# Patient Record
Sex: FEMALE | Race: ASIAN | NOT HISPANIC OR LATINO | Employment: UNEMPLOYED | URBAN - METROPOLITAN AREA
[De-identification: names, ages, dates, MRNs, and addresses within clinical notes are randomized per-mention and may not be internally consistent; named-entity substitution may affect disease eponyms.]

---

## 2021-12-13 LAB
HPV16+18+H RISK 12 DNA CVX-IMP: NEGATIVE
PAP RECOMMENDATION EXT: NORMAL
PAP SMEAR: NORMAL

## 2022-04-11 ENCOUNTER — HISTORICAL (OUTPATIENT)
Dept: ADMINISTRATIVE | Facility: HOSPITAL | Age: 37
End: 2022-04-11

## 2022-04-28 VITALS
BODY MASS INDEX: 33.12 KG/M2 | DIASTOLIC BLOOD PRESSURE: 90 MMHG | OXYGEN SATURATION: 98 % | HEIGHT: 63 IN | WEIGHT: 186.94 LBS | SYSTOLIC BLOOD PRESSURE: 139 MMHG

## 2022-05-08 ENCOUNTER — HOSPITAL ENCOUNTER (EMERGENCY)
Facility: HOSPITAL | Age: 37
Discharge: HOME | End: 2022-05-08
Attending: EMERGENCY MEDICINE
Payer: MEDICAID

## 2022-05-08 VITALS
WEIGHT: 200.63 LBS | HEIGHT: 62 IN | RESPIRATION RATE: 16 BRPM | TEMPERATURE: 98 F | OXYGEN SATURATION: 98 % | DIASTOLIC BLOOD PRESSURE: 91 MMHG | SYSTOLIC BLOOD PRESSURE: 137 MMHG | HEART RATE: 75 BPM | BODY MASS INDEX: 36.92 KG/M2

## 2022-05-08 DIAGNOSIS — R22.0 LEFT FACIAL SWELLING: ICD-10-CM

## 2022-05-08 DIAGNOSIS — B02.9 HERPES ZOSTER WITHOUT COMPLICATION: Primary | ICD-10-CM

## 2022-05-08 PROBLEM — O98.419 CHRONIC HEPATITIS B AFFECTING ANTEPARTUM CARE OF MOTHER: Status: ACTIVE | Noted: 2021-08-02

## 2022-05-08 PROBLEM — O99.210 OBESITY IN PREGNANCY: Status: ACTIVE | Noted: 2021-08-02

## 2022-05-08 PROBLEM — O10.919 CHRONIC HYPERTENSION IN PREGNANCY: Status: ACTIVE | Noted: 2021-08-02

## 2022-05-08 PROBLEM — B18.1 CHRONIC HEPATITIS B AFFECTING ANTEPARTUM CARE OF MOTHER: Status: ACTIVE | Noted: 2021-08-02

## 2022-05-08 LAB
ALBUMIN SERPL-MCNC: 4.4 GM/DL (ref 3.5–5)
ALBUMIN/GLOB SERPL: 1.2 RATIO (ref 1.1–2)
ALP SERPL-CCNC: 44 UNIT/L (ref 40–150)
ALT SERPL-CCNC: 54 UNIT/L (ref 0–55)
AST SERPL-CCNC: 34 UNIT/L (ref 5–34)
B-HCG UR QL: NEGATIVE
BASOPHILS # BLD AUTO: 0.02 X10(3)/MCL (ref 0–0.2)
BASOPHILS NFR BLD AUTO: 0.4 %
BILIRUBIN DIRECT+TOT PNL SERPL-MCNC: 0.2 MG/DL (ref 0–0.5)
BILIRUBIN DIRECT+TOT PNL SERPL-MCNC: 0.4 MG/DL (ref 0–0.8)
BILIRUBIN DIRECT+TOT PNL SERPL-MCNC: 0.6 MG/DL
BUN SERPL-MCNC: 10.5 MG/DL (ref 7–18.7)
CALCIUM SERPL-MCNC: 9.7 MG/DL (ref 8.4–10.2)
CHLORIDE SERPL-SCNC: 107 MMOL/L (ref 98–107)
CO2 SERPL-SCNC: 26 MMOL/L (ref 22–29)
CREAT SERPL-MCNC: 0.68 MG/DL (ref 0.55–1.02)
CTP QC/QA: YES
EOSINOPHIL # BLD AUTO: 0.16 X10(3)/MCL (ref 0–0.9)
EOSINOPHIL NFR BLD AUTO: 3.3 %
ERYTHROCYTE [DISTWIDTH] IN BLOOD BY AUTOMATED COUNT: 11.4 % (ref 11.5–17)
GLOBULIN SER-MCNC: 3.6 GM/DL (ref 2.4–3.5)
GLUCOSE SERPL-MCNC: 97 MG/DL (ref 74–100)
HCT VFR BLD AUTO: 41.9 % (ref 37–47)
HGB BLD-MCNC: 14.2 GM/DL (ref 12–16)
IMM GRANULOCYTES # BLD AUTO: 0.02 X10(3)/MCL (ref 0–0.02)
IMM GRANULOCYTES NFR BLD AUTO: 0.4 % (ref 0–0.43)
LYMPHOCYTES # BLD AUTO: 1.15 X10(3)/MCL (ref 0.6–4.6)
LYMPHOCYTES NFR BLD AUTO: 23.7 %
MCH RBC QN AUTO: 30.1 PG (ref 27–31)
MCHC RBC AUTO-ENTMCNC: 33.9 MG/DL (ref 33–36)
MCV RBC AUTO: 88.8 FL (ref 80–94)
MONOCYTES # BLD AUTO: 0.27 X10(3)/MCL (ref 0.1–1.3)
MONOCYTES NFR BLD AUTO: 5.6 %
NEUTROPHILS # BLD AUTO: 3.2 X10(3)/MCL (ref 2.1–9.2)
NEUTROPHILS NFR BLD AUTO: 66.6 %
NRBC BLD AUTO-RTO: 0 %
PLATELET # BLD AUTO: 242 X10(3)/MCL (ref 130–400)
PMV BLD AUTO: 9.5 FL (ref 9.4–12.4)
POTASSIUM SERPL-SCNC: 3.7 MMOL/L (ref 3.5–5.1)
PROT SERPL-MCNC: 8 GM/DL (ref 6.4–8.3)
RBC # BLD AUTO: 4.72 X10(6)/MCL (ref 4.2–5.4)
SODIUM SERPL-SCNC: 139 MMOL/L (ref 136–145)
WBC # SPEC AUTO: 4.9 X10(3)/MCL (ref 4.5–11.5)

## 2022-05-08 PROCEDURE — 85025 COMPLETE CBC W/AUTO DIFF WBC: CPT | Performed by: NURSE PRACTITIONER

## 2022-05-08 PROCEDURE — 99284 EMERGENCY DEPT VISIT MOD MDM: CPT | Mod: 25

## 2022-05-08 PROCEDURE — 81025 URINE PREGNANCY TEST: CPT | Performed by: NURSE PRACTITIONER

## 2022-05-08 PROCEDURE — 36415 COLL VENOUS BLD VENIPUNCTURE: CPT | Performed by: NURSE PRACTITIONER

## 2022-05-08 PROCEDURE — 80053 COMPREHEN METABOLIC PANEL: CPT | Performed by: NURSE PRACTITIONER

## 2022-05-08 RX ORDER — GABAPENTIN 300 MG/1
300 CAPSULE ORAL DAILY
Qty: 30 CAPSULE | Refills: 0 | Status: SHIPPED | OUTPATIENT
Start: 2022-05-08 | End: 2023-05-18

## 2022-05-08 RX ORDER — ACETAMINOPHEN AND CODEINE PHOSPHATE 120; 12 MG/5ML; MG/5ML
1 SOLUTION ORAL DAILY
COMMUNITY
Start: 2022-04-30

## 2022-05-08 RX ORDER — ACYCLOVIR 800 MG/1
800 TABLET ORAL
Qty: 35 TABLET | Refills: 0 | Status: SHIPPED | OUTPATIENT
Start: 2022-05-08 | End: 2023-05-18

## 2022-05-08 RX ORDER — DICLOFENAC SODIUM 75 MG/1
75 TABLET, DELAYED RELEASE ORAL 2 TIMES DAILY
Qty: 14 TABLET | Refills: 0 | Status: SHIPPED | OUTPATIENT
Start: 2022-05-08 | End: 2022-05-15

## 2022-05-08 NOTE — DISCHARGE INSTRUCTIONS
Follow up with your primary care physician in 3-5 days for follow up evaluation.  Take pain medication as prescribed.  Return to the [facility] ED in 3 days for worsening redness, tenderness, or drainage from area.

## 2022-05-08 NOTE — ED PROVIDER NOTES
"Encounter Date: 5/8/2022       History     Chief Complaint   Patient presents with    Facial Swelling     Started with pain lt temple area then small blisters and redness , that progress to swelling over lt side of face and eyelid , sclera clear , nodes in neck hard and tender and enlarged , pt afebrile currently, and blisttered area has scabbed over      Pt is a 36 yr old female who presents to the [facility] ED reporting a cluster of "blisters" to her Lt forehead. Symptoms x 3 days. Admits to being under stress due to her children recently being sick. Pt verbalizes concerns because she is also having swelling to her Lt upper eyelid as well as to her to Lt side of her neck. Denies cough, fever, chest pain, change in vision in affected eye, SOB, swelling to lips/throat, inability to swallow, or abdominal pain.         Review of patient's allergies indicates:  No Known Allergies  No past medical history on file.  No past surgical history on file.  No family history on file.     Review of Systems   Constitutional: Negative for fever.   HENT: Positive for facial swelling. Negative for ear discharge, ear pain, hearing loss, mouth sores, rhinorrhea, sinus pressure, sinus pain, sore throat and trouble swallowing.    Respiratory: Negative for chest tightness and shortness of breath.    Cardiovascular: Negative for chest pain.   Gastrointestinal: Negative for nausea.   Genitourinary: Negative for dysuria.   Musculoskeletal: Negative for back pain.   Skin: Negative for rash.   Neurological: Negative for weakness.   Hematological: Does not bruise/bleed easily.   All other systems reviewed and are negative.      Physical Exam     Initial Vitals [05/08/22 1013]   BP Pulse Resp Temp SpO2   (!) 137/91 75 16 98.1 °F (36.7 °C) 98 %      MAP       --         Physical Exam    Nursing note and vitals reviewed.  Constitutional: She appears well-developed.   HENT:   Head: Normocephalic and atraumatic.       Eyes: Conjunctivae and EOM are " normal. Pupils are equal, round, and reactive to light. Left eye exhibits no discharge and no exudate.       Neck: Neck supple.   Normal range of motion.  Cardiovascular: Normal rate and regular rhythm.   Pulmonary/Chest: Breath sounds normal.   Abdominal: Abdomen is soft. Bowel sounds are normal. There is no rebound, no guarding, no tenderness at McBurney's point and negative Turner's sign. negative psoas sign  Musculoskeletal:         General: Normal range of motion.      Cervical back: Normal range of motion and neck supple.     Lymphadenopathy:     She has cervical adenopathy.        Left cervical: Superficial cervical (No erythema, slight tenderness) adenopathy present.   Neurological: She is alert and oriented to person, place, and time. She has normal strength and normal reflexes.   Skin: Skin is warm and dry. Capillary refill takes less than 2 seconds.   Psychiatric: She has a normal mood and affect. Her speech is normal and behavior is normal. Judgment and thought content normal.         ED Course   Procedures  Labs Reviewed   COMPREHENSIVE METABOLIC PANEL - Abnormal; Notable for the following components:       Result Value    Globulin 3.6 (*)     All other components within normal limits   CBC WITH DIFFERENTIAL - Abnormal; Notable for the following components:    RDW 11.4 (*)     IG# 0.02 (*)     All other components within normal limits   POCT URINE PREGNANCY - Abnormal; Notable for the following components:    POC Preg Test, Ur Negative (*)     All other components within normal limits   CBC W/ AUTO DIFFERENTIAL    Narrative:     The following orders were created for panel order CBC auto differential.  Procedure                               Abnormality         Status                     ---------                               -----------         ------                     CBC with Differential[[id number]]        Abnormal            Final result                 Please view results for these tests on the  individual orders.          Imaging Results    None          Medications - No data to display  Medical Decision Making:   Differential Diagnosis:   Shingles  Cellulitis  Clinical Tests:   Lab Tests: Ordered and Reviewed  The following lab test(s) were unremarkable: CBC, CMP and UPT  ED Management:  11:48 Pt presentation is consistent with suspected shingles rash. I will place pt on antiviral medication for issue as well as symptoms relief. Discussed with pt return to ED instructions including her need to return immediately for swelling to throat/tongue/lips, chest pain, or SOB.  Reassessed patient at this time. Reports condition has improved. Discussed with patient all pertinent ED information and results. Discussed diagnosis and treatment plan with patient. Follow up instructions and return to ED instruction have been given. All questions and concerns were addressed at this time. Patient voices understanding of information and instructions. Patient is comfortable with plan and discharge. Patient is stable for discharge.                         Clinical Impression:   Final diagnoses:  [R22.0] Left facial swelling  [B02.9] Herpes zoster without complication (Primary)          ED Disposition Condition    Discharge Stable        ED Prescriptions     Medication Sig Dispense Start Date End Date Auth. Provider    acyclovir (ZOVIRAX) 800 MG Tab Take 1 tablet (800 mg total) by mouth 5 (five) times daily. for 7 days 35 tablet 5/8/2022 5/15/2022 JOSE Prabhakar Jr.    diclofenac (VOLTAREN) 75 MG EC tablet Take 1 tablet (75 mg total) by mouth 2 (two) times daily. for 7 days 14 tablet 5/8/2022 5/15/2022 BOBBY Prabhakar Jr.P    gabapentin (NEURONTIN) 300 MG capsule Take 1 capsule (300 mg total) by mouth once daily. 30 capsule 5/8/2022 6/7/2022 JOSE Prabhakar Jr.        Follow-up Information     Follow up With Specialties Details Why Contact Info    [facility] Emergency Medicine In 3 days  As needed, If symptoms worsen [address] [id number] [phone]           Bebo Mobley Jr., P  05/08/22 5542

## 2023-03-06 ENCOUNTER — CLINICAL SUPPORT (OUTPATIENT)
Dept: URGENT CARE | Facility: CLINIC | Age: 38
End: 2023-03-06
Payer: MEDICAID

## 2023-03-06 VITALS
TEMPERATURE: 102 F | WEIGHT: 207.88 LBS | HEART RATE: 111 BPM | SYSTOLIC BLOOD PRESSURE: 128 MMHG | RESPIRATION RATE: 22 BRPM | BODY MASS INDEX: 36.83 KG/M2 | OXYGEN SATURATION: 98 % | HEIGHT: 63 IN | DIASTOLIC BLOOD PRESSURE: 86 MMHG

## 2023-03-06 DIAGNOSIS — B08.4 HAND, FOOT AND MOUTH DISEASE: Primary | ICD-10-CM

## 2023-03-06 DIAGNOSIS — R52 BODY ACHES: ICD-10-CM

## 2023-03-06 LAB
CTP QC/QA: YES
CTP QC/QA: YES
FLUAV AG NPH QL: NEGATIVE
FLUBV AG NPH QL: NEGATIVE
SARS-COV-2 RDRP RESP QL NAA+PROBE: NEGATIVE

## 2023-03-06 PROCEDURE — 99214 OFFICE O/P EST MOD 30 MIN: CPT | Mod: PBBFAC

## 2023-03-06 PROCEDURE — 99203 PR OFFICE/OUTPT VISIT, NEW, LEVL III, 30-44 MIN: ICD-10-PCS | Mod: S$PBB,,,

## 2023-03-06 PROCEDURE — 87635 SARS-COV-2 COVID-19 AMP PRB: CPT | Mod: PBBFAC

## 2023-03-06 PROCEDURE — 87804 INFLUENZA ASSAY W/OPTIC: CPT | Mod: 59,PBBFAC

## 2023-03-06 PROCEDURE — 99203 OFFICE O/P NEW LOW 30 MIN: CPT | Mod: S$PBB,,,

## 2023-03-06 NOTE — LETTER
March 6, 2023      [facility]  [address] [address]  Phone: [phone]       Patient: Gretel Stein   YOB: 1985  Date of Visit: 03/06/2023    To Whom It May Concern:    Abel Stein  was at [facility] on 03/06/2023. The patient may return to work/school on MAR 11 2023 with no restrictions. If you have any questions or concerns, or if I can be of further assistance, please do not hesitate to contact me.    Sincerely,    KADI STRICKLAND NP

## 2023-03-07 NOTE — PROGRESS NOTES
"Subjective:       Patient ID: Gretel Stein is a 37 y.o. female.    Vitals:  height is 5' 2.99" (1.6 m) and weight is 94.3 kg (207 lb 14.3 oz). Her temperature is 102 °F (38.9 °C) (abnormal). Her blood pressure is 128/86 and her pulse is 111 (abnormal). Her respiration is 22 (abnormal) and oxygen saturation is 98%.     Chief Complaint: Fever (101.3 at home) and Generalized Body Aches (X 2days)    Pt states fever and body aches for the last 2 days. Has a small spot on hand and in throat, son has hand, foot and mouth.     Fever   Associated symptoms include a rash.     Constitution: Positive for fever.   HENT:  Positive for mouth sores.    Neck: neck negative.   Cardiovascular: Negative.    Eyes: Negative.    Respiratory: Negative.     Gastrointestinal: Negative.    Genitourinary: Negative.    Musculoskeletal: Negative.    Skin:  Positive for rash.   Neurological: Negative.      Objective:      Physical Exam   HENT:   Head: Normocephalic.   Ears:   Right Ear: Tympanic membrane, external ear and ear canal normal.   Left Ear: Tympanic membrane, external ear and ear canal normal.   Nose: Nose normal.   Mouth/Throat: Uvula is midline and mucous membranes are normal. Mucous membranes are moist. Posterior oropharyngeal erythema present.       Eyes: Pupils are equal, round, and reactive to light.   Abdominal: Normal appearance.   Neurological: She is alert.   Vitals reviewed.      Assessment:       1. Hand, foot and mouth disease    2. Body aches            Plan:         Hand, foot and mouth disease    Body aches  -     POCT COVID-19 Rapid Screening  -     POCT Influenza A/B    Take Tylenol/ibuprofen as needed for pain and fever.  Can you throat lozenges or spray for throat pain.    Plenty of fluids and rest.    ER precautions given, patient verbalized understanding.     Please see provided patient education for guidance.    Follow up with PCP or return to clinic if symptoms worsen or do not improve.                     "

## 2023-05-15 ENCOUNTER — PATIENT OUTREACH (OUTPATIENT)
Dept: ADMINISTRATIVE | Facility: HOSPITAL | Age: 38
End: 2023-05-15
Payer: MEDICAID

## 2023-05-18 ENCOUNTER — OFFICE VISIT (OUTPATIENT)
Dept: FAMILY MEDICINE | Facility: CLINIC | Age: 38
End: 2023-05-18
Payer: MEDICAID

## 2023-05-18 VITALS
RESPIRATION RATE: 18 BRPM | HEART RATE: 71 BPM | TEMPERATURE: 98 F | WEIGHT: 211 LBS | DIASTOLIC BLOOD PRESSURE: 84 MMHG | HEIGHT: 63 IN | BODY MASS INDEX: 37.39 KG/M2 | OXYGEN SATURATION: 99 % | SYSTOLIC BLOOD PRESSURE: 131 MMHG

## 2023-05-18 DIAGNOSIS — Z00.00 WELLNESS EXAMINATION: Primary | ICD-10-CM

## 2023-05-18 LAB
ALBUMIN SERPL-MCNC: 4.4 G/DL (ref 3.5–5)
ALBUMIN/GLOB SERPL: 1.2 RATIO (ref 1.1–2)
ALP SERPL-CCNC: 45 UNIT/L (ref 40–150)
ALT SERPL-CCNC: 83 UNIT/L (ref 0–55)
APPEARANCE UR: CLEAR
AST SERPL-CCNC: 40 UNIT/L (ref 5–34)
BACTERIA #/AREA URNS AUTO: ABNORMAL /HPF
BILIRUB UR QL STRIP.AUTO: NEGATIVE MG/DL
BILIRUBIN DIRECT+TOT PNL SERPL-MCNC: 0.5 MG/DL
BUN SERPL-MCNC: 7 MG/DL (ref 7–18.7)
CALCIUM SERPL-MCNC: 9.4 MG/DL (ref 8.4–10.2)
CHLORIDE SERPL-SCNC: 106 MMOL/L (ref 98–107)
CHOLEST SERPL-MCNC: 209 MG/DL
CHOLEST/HDLC SERPL: 4 {RATIO} (ref 0–5)
CO2 SERPL-SCNC: 25 MMOL/L (ref 22–29)
COLOR UR: ABNORMAL
CREAT SERPL-MCNC: 0.7 MG/DL (ref 0.55–1.02)
ERYTHROCYTE [DISTWIDTH] IN BLOOD BY AUTOMATED COUNT: 11.9 % (ref 11.5–17)
EST. AVERAGE GLUCOSE BLD GHB EST-MCNC: 111.2 MG/DL
GFR SERPLBLD CREATININE-BSD FMLA CKD-EPI: >60 MLS/MIN/1.73/M2
GLOBULIN SER-MCNC: 3.6 GM/DL (ref 2.4–3.5)
GLUCOSE SERPL-MCNC: 83 MG/DL (ref 74–100)
GLUCOSE UR QL STRIP.AUTO: NORMAL MG/DL
HBA1C MFR BLD: 5.5 %
HCT VFR BLD AUTO: 41.9 % (ref 37–47)
HCV AB SERPL QL IA: NONREACTIVE
HDLC SERPL-MCNC: 51 MG/DL (ref 35–60)
HGB BLD-MCNC: 14.4 G/DL (ref 12–16)
HIV 1+2 AB+HIV1 P24 AG SERPL QL IA: NONREACTIVE
HYALINE CASTS #/AREA URNS LPF: ABNORMAL /LPF
KETONES UR QL STRIP.AUTO: NEGATIVE MG/DL
LDLC SERPL CALC-MCNC: 137 MG/DL (ref 50–140)
LEUKOCYTE ESTERASE UR QL STRIP.AUTO: NEGATIVE UNIT/L
MCH RBC QN AUTO: 31 PG (ref 27–31)
MCHC RBC AUTO-ENTMCNC: 34.4 G/DL (ref 33–36)
MCV RBC AUTO: 90.3 FL (ref 80–94)
MUCOUS THREADS URNS QL MICRO: ABNORMAL /LPF
NITRITE UR QL STRIP.AUTO: NEGATIVE
NRBC BLD AUTO-RTO: 0 %
PH UR STRIP.AUTO: 6 [PH]
PLATELET # BLD AUTO: 286 X10(3)/MCL (ref 130–400)
PMV BLD AUTO: 9.8 FL (ref 7.4–10.4)
POTASSIUM SERPL-SCNC: 3.8 MMOL/L (ref 3.5–5.1)
PROT SERPL-MCNC: 8 GM/DL (ref 6.4–8.3)
PROT UR QL STRIP.AUTO: NEGATIVE MG/DL
RBC # BLD AUTO: 4.64 X10(6)/MCL (ref 4.2–5.4)
RBC #/AREA URNS AUTO: ABNORMAL /HPF
RBC UR QL AUTO: ABNORMAL UNIT/L
SODIUM SERPL-SCNC: 141 MMOL/L (ref 136–145)
SP GR UR STRIP.AUTO: 1.01
SQUAMOUS #/AREA URNS LPF: ABNORMAL /HPF
TRIGL SERPL-MCNC: 103 MG/DL (ref 37–140)
TSH SERPL-ACNC: 1.36 UIU/ML (ref 0.35–4.94)
UROBILINOGEN UR STRIP-ACNC: NORMAL MG/DL
VLDLC SERPL CALC-MCNC: 21 MG/DL
WBC # SPEC AUTO: 5.48 X10(3)/MCL (ref 4.5–11.5)
WBC #/AREA URNS AUTO: ABNORMAL /HPF

## 2023-05-18 PROCEDURE — 1160F PR REVIEW ALL MEDS BY PRESCRIBER/CLIN PHARMACIST DOCUMENTED: ICD-10-PCS | Mod: CPTII,,, | Performed by: NURSE PRACTITIONER

## 2023-05-18 PROCEDURE — 90677 PCV20 VACCINE IM: CPT | Mod: PBBFAC

## 2023-05-18 PROCEDURE — 3079F DIAST BP 80-89 MM HG: CPT | Mod: CPTII,,, | Performed by: NURSE PRACTITIONER

## 2023-05-18 PROCEDURE — 1159F MED LIST DOCD IN RCRD: CPT | Mod: CPTII,,, | Performed by: NURSE PRACTITIONER

## 2023-05-18 PROCEDURE — 3008F BODY MASS INDEX DOCD: CPT | Mod: CPTII,,, | Performed by: NURSE PRACTITIONER

## 2023-05-18 PROCEDURE — 3075F SYST BP GE 130 - 139MM HG: CPT | Mod: CPTII,,, | Performed by: NURSE PRACTITIONER

## 2023-05-18 PROCEDURE — 80061 LIPID PANEL: CPT | Performed by: NURSE PRACTITIONER

## 2023-05-18 PROCEDURE — 85027 COMPLETE CBC AUTOMATED: CPT | Performed by: NURSE PRACTITIONER

## 2023-05-18 PROCEDURE — 1159F PR MEDICATION LIST DOCUMENTED IN MEDICAL RECORD: ICD-10-PCS | Mod: CPTII,,, | Performed by: NURSE PRACTITIONER

## 2023-05-18 PROCEDURE — 36415 COLL VENOUS BLD VENIPUNCTURE: CPT | Performed by: NURSE PRACTITIONER

## 2023-05-18 PROCEDURE — 3075F PR MOST RECENT SYSTOLIC BLOOD PRESS GE 130-139MM HG: ICD-10-PCS | Mod: CPTII,,, | Performed by: NURSE PRACTITIONER

## 2023-05-18 PROCEDURE — 84443 ASSAY THYROID STIM HORMONE: CPT | Performed by: NURSE PRACTITIONER

## 2023-05-18 PROCEDURE — 3008F PR BODY MASS INDEX (BMI) DOCUMENTED: ICD-10-PCS | Mod: CPTII,,, | Performed by: NURSE PRACTITIONER

## 2023-05-18 PROCEDURE — 1160F RVW MEDS BY RX/DR IN RCRD: CPT | Mod: CPTII,,, | Performed by: NURSE PRACTITIONER

## 2023-05-18 PROCEDURE — 80053 COMPREHEN METABOLIC PANEL: CPT | Performed by: NURSE PRACTITIONER

## 2023-05-18 PROCEDURE — 87389 HIV-1 AG W/HIV-1&-2 AB AG IA: CPT | Performed by: NURSE PRACTITIONER

## 2023-05-18 PROCEDURE — 90471 IMMUNIZATION ADMIN: CPT | Mod: PBBFAC

## 2023-05-18 PROCEDURE — 83036 HEMOGLOBIN GLYCOSYLATED A1C: CPT | Performed by: NURSE PRACTITIONER

## 2023-05-18 PROCEDURE — 3079F PR MOST RECENT DIASTOLIC BLOOD PRESSURE 80-89 MM HG: ICD-10-PCS | Mod: CPTII,,, | Performed by: NURSE PRACTITIONER

## 2023-05-18 PROCEDURE — 99395 PREV VISIT EST AGE 18-39: CPT | Mod: S$PBB,,, | Performed by: NURSE PRACTITIONER

## 2023-05-18 PROCEDURE — 86803 HEPATITIS C AB TEST: CPT | Performed by: NURSE PRACTITIONER

## 2023-05-18 PROCEDURE — 99214 OFFICE O/P EST MOD 30 MIN: CPT | Mod: PBBFAC | Performed by: NURSE PRACTITIONER

## 2023-05-18 PROCEDURE — 81001 URINALYSIS AUTO W/SCOPE: CPT | Performed by: NURSE PRACTITIONER

## 2023-05-18 PROCEDURE — 99395 PR PREVENTIVE VISIT,EST,18-39: ICD-10-PCS | Mod: S$PBB,,, | Performed by: NURSE PRACTITIONER

## 2023-05-18 RX ADMIN — PNEUMOCOCCAL 20-VALENT CONJUGATE VACCINE 0.5 ML
2.2; 2.2; 2.2; 2.2; 2.2; 2.2; 2.2; 2.2; 2.2; 2.2; 2.2; 2.2; 2.2; 2.2; 2.2; 2.2; 4.4; 2.2; 2.2; 2.2 INJECTION, SUSPENSION INTRAMUSCULAR at 09:05

## 2023-05-18 NOTE — PROGRESS NOTES
14:55 spoke to patient regarding her blood work test results over the phone, patient id self and accurately give date of birth.  Discussed lab work, only abnormality can see she has elevated liver enzymes.  Patient state she does drink alcohol more often also she has a chronic hepatitis B infection.  Instructed patient will continue to monitor, decrease alcohol intake because previously 1 year ago new liver enzymes were within normal range.  Patient does not consume large about acetaminophen.  She might take 1-2 tab a day if needed for aches.  Will re-evaluate on next appointment.  Urinalysis shows blood in urine, patient had missed her initial cycle in March, and possibly she is restarting her menstrual cycles.  Denies UTI symptoms.  She return to clinic if she is having any issues.  Hemoglobin 1 AC within normal range, estimated average blood glucose 111, hemoglobin A1c 5.5%.  Discussed weight loss, monitor portion size.  Stay hydrated with water.  Otherwise blood work within normal range.  Patient verbalized understanding and agreed to plan of care

## 2023-05-18 NOTE — PROGRESS NOTES
Patient Name: Gretel Stein   : 1985  MRN: [id number]     SUBJECTIVE DATA:    CHIEF COMPLAINT:   Gretel Stein is a 37 y.o. female who presents to clinic today with Establish Care        HPI:  37-year-old female presents to the clinic to complete her yearly annual wellness exam.    Social Determinants of Health     Tobacco Use: High Risk    Smoking Tobacco Use: Every Day    Smokeless Tobacco Use: Never    Passive Exposure: Not on file   Alcohol Use: Not At Risk    Frequency of Alcohol Consumption: Monthly or less    Average Number of Drinks: 3 or 4    Frequency of Binge Drinking: Never   Financial Resource Strain: Low Risk     Difficulty of Paying Living Expenses: Not hard at all   Food Insecurity: No Food Insecurity    Worried About Running Out of Food in the Last Year: Never true    Ran Out of Food in the Last Year: Never true   Transportation Needs: No Transportation Needs    Lack of Transportation (Medical): No    Lack of Transportation (Non-Medical): No   Physical Activity: Sufficiently Active    Days of Exercise per Week: 4 days    Minutes of Exercise per Session: 40 min   Stress: No Stress Concern Present    Feeling of Stress : Not at all   Social Connections: Moderately Integrated    Frequency of Communication with Friends and Family: Twice a week    Frequency of Social Gatherings with Friends and Family: Twice a week    Attends Yarsanism Services: 1 to 4 times per year    Active Member of Clubs or Organizations: No    Attends Club or Organization Meetings: 1 to 4 times per year    Marital Status: Never    Housing Stability: Low Risk     Unable to Pay for Housing in the Last Year: No    Number of Places Lived in the Last Year: 1    Unstable Housing in the Last Year: No   Depression: Low Risk     Last PHQ Score: 0   LMP march.  Gun safety:  Does not own a gun.  Car safety:  Seat belt used all the time.  Water:  Stay hydrated with fluids, drink 6-8 cups of water daily.  Smoke cessation:  Patient declined  "smoke cessation, and she is trying to quit on her own.  Patient is made aware of services offered at [facility].  Alcohol:  Drink in moderation.  Exercise: exercise 30 minutes a day up to 5 days a week.  Stay active.  Nutrition:  Follow low-cholesterol, low-fat, low-salt diet.  Consume fresh fruits and vegetables.  Keep in mind serving or portion size, lose weight.  Dental:  Patient does follow-up with a dentist yearly.  Ophthalmology:  Patient does follow-up with ophthalmologist yearly.  Did not bring her glasses with her today.  Cervical cancer screening exam completed on Dec, 2022  Pneumococcal vaccine IM given the clinic.  Fasting blood work drawn today will notify of test results when they become available.  Patient denies chest pain, shortness of breath, dyspnea on exertion, palpitations, peripheral edema, abdominal pain, nausea, vomiting, diarrhea, constipation, fatigue, fever, chills, dysuria,  hematuria, melena, or hematochezia.      HPI      ALLERGIES: Review of patient's allergies indicates:  No Known Allergies      ROS:  Review of Systems   All other systems reviewed and are negative.      OBJECTIVE DATA:  Vital signs  Vitals:    05/18/23 0831   BP: 131/84   Pulse: 71   Resp: 18   Temp: 97.8 °F (36.6 °C)   TempSrc: Oral   SpO2: 99%   Weight: 95.7 kg (211 lb)   Height: 5' 3" (1.6 m)      Body mass index is 37.38 kg/m².    PHYSICAL EXAM:   Physical Exam  Vitals and nursing note reviewed.   Constitutional:       General: She is awake. She is not in acute distress.     Appearance: Normal appearance. She is well-developed and well-groomed. She is obese. She is not ill-appearing, toxic-appearing or diaphoretic.   HENT:      Head: Normocephalic and atraumatic.      Right Ear: Hearing, tympanic membrane, ear canal and external ear normal. There is no impacted cerumen.      Left Ear: Hearing, tympanic membrane, ear canal and external ear normal. There is no impacted cerumen.      Nose: Nose normal. No congestion or " rhinorrhea.      Right Nostril: No epistaxis.      Left Nostril: No epistaxis.      Right Turbinates: Not swollen.      Left Turbinates: Not swollen.      Right Sinus: No maxillary sinus tenderness or frontal sinus tenderness.      Left Sinus: No maxillary sinus tenderness or frontal sinus tenderness.      Mouth/Throat:      Lips: Pink.      Mouth: Mucous membranes are moist.      Pharynx: Oropharynx is clear. Uvula midline. No posterior oropharyngeal erythema.   Eyes:      General: Lids are normal. Gaze aligned appropriately. No scleral icterus.     Extraocular Movements: Extraocular movements intact.      Conjunctiva/sclera: Conjunctivae normal.      Pupils: Pupils are equal, round, and reactive to light.      Visual Fields: Right eye visual fields normal and left eye visual fields normal.   Neck:      Thyroid: No thyroid mass, thyromegaly or thyroid tenderness.      Vascular: No carotid bruit.      Trachea: Trachea and phonation normal.   Cardiovascular:      Rate and Rhythm: Normal rate and regular rhythm.      Pulses: Normal pulses.           Carotid pulses are 2+ on the right side and 2+ on the left side.       Radial pulses are 2+ on the right side and 2+ on the left side.        Femoral pulses are 2+ on the right side and 2+ on the left side.       Dorsalis pedis pulses are 2+ on the right side and 2+ on the left side.        Posterior tibial pulses are 2+ on the right side and 2+ on the left side.      Heart sounds: Normal heart sounds. No murmur heard.  Pulmonary:      Effort: Pulmonary effort is normal.      Breath sounds: Normal breath sounds and air entry. No wheezing or rhonchi.   Abdominal:      General: Abdomen is flat. Bowel sounds are normal. There is no distension.      Palpations: Abdomen is soft. There is no mass.      Tenderness: There is no abdominal tenderness. There is no right CVA tenderness, left CVA tenderness, guarding or rebound.      Hernia: No hernia is present.   Musculoskeletal:          General: Normal range of motion.      Cervical back: Full passive range of motion without pain, normal range of motion and neck supple. No rigidity or tenderness.      Right lower leg: No edema.      Left lower leg: No edema.      Right foot: Normal range of motion. No deformity, bunion, Charcot foot, foot drop or prominent metatarsal heads.      Left foot: Normal range of motion. No deformity, bunion, Charcot foot, foot drop or prominent metatarsal heads.        Feet:    Feet:      Right foot:      Protective Sensation: 9 sites tested.  9 sites sensed.      Skin integrity: Callus present.      Toenail Condition: Right toenails are normal.      Left foot:      Protective Sensation: 9 sites tested.  9 sites sensed.      Skin integrity: Skin integrity normal.      Toenail Condition: Left toenails are normal.      Comments: Maintain a good foot hygiene, continue to wear appropriate footwear.  Use Aquaphor help soften callus.  Continue to inspect feet as needed.  Lymphadenopathy:      Cervical: No cervical adenopathy.      Right cervical: No superficial cervical adenopathy.     Left cervical: No superficial cervical adenopathy.   Skin:     General: Skin is warm and dry.      Capillary Refill: Capillary refill takes less than 2 seconds.      Findings: No rash.   Neurological:      General: No focal deficit present.      Mental Status: She is alert and oriented to person, place, and time. Mental status is at baseline.      GCS: GCS eye subscore is 4. GCS verbal subscore is 5. GCS motor subscore is 6.      Cranial Nerves: Cranial nerves 2-12 are intact.      Sensory: Sensation is intact.      Motor: Motor function is intact.      Coordination: Coordination is intact.      Gait: Gait is intact. Gait normal.   Psychiatric:         Attention and Perception: Attention and perception normal.         Mood and Affect: Mood and affect normal.         Speech: Speech normal.         Behavior: Behavior normal. Behavior is  cooperative.         Thought Content: Thought content normal.         Cognition and Memory: Cognition and memory normal.         Judgment: Judgment normal.        ASSESSMENT/PLAN:  1. Wellness examination  -     Hemoglobin A1C  -     CBC Without Differential  -     Comprehensive Metabolic Panel  -     Lipid Panel  -     TSH  -     Urinalysis, Reflex to Urine Culture  -     HIV 1/2 Ag/Ab (4th Gen)  -     Hepatitis C Antibody  -     pneumoc 20-ira conj-dip cr(PF) (PREVNAR-20 (PF)) injection Syrg 0.5 mL           RESULTS:  No results found for this or any previous visit (from the past 1008 hour(s)).      Follow Up:  Follow up in about 6 months (around 11/18/2023).      Previous medical history/lab work/radiology reviewed and considered during medical management decisions.   Medication list reviewed and medication reconciliation performed.  Patient was provided  and care about his/her current diagnosis (es) and medications including risk/benefit and side effects/adverse events, over the counter medication uses/doses, home self-care and contact precautions,  and red flags and indications for when to seek immediate medical attention.   Patient was advised to continue compliance with current medication list and medical recommendations.  Patient dvised continued compliance with recommended eating habits/ diets for medical conditions and exercise 150 minutes/ week (if possible) for medical condition (s).  Educational handouts and instructions on selected disease management in AVS (After Visit Summary).    All of the patient's questions were answered to patient's satisfaction.   The patient was receptive, expressed verbal understanding and agreement the above plan.           This note was created with the assistance of a voice recognition software or phone dictation. There may be transcription errors as a result of using this technology however minimal. Effort has been made to assure accuracy of transcription but any  obvious errors or omissions should be clarified with the author of the document

## 2023-08-21 PROBLEM — Z00.00 WELLNESS EXAMINATION: Status: RESOLVED | Noted: 2023-05-18 | Resolved: 2023-08-21

## 2023-10-25 ENCOUNTER — OFFICE VISIT (OUTPATIENT)
Dept: URGENT CARE | Facility: CLINIC | Age: 38
End: 2023-10-25
Payer: MEDICAID

## 2023-10-25 ENCOUNTER — HOSPITAL ENCOUNTER (OUTPATIENT)
Dept: RADIOLOGY | Facility: HOSPITAL | Age: 38
Discharge: HOME | End: 2023-10-25
Payer: MEDICAID

## 2023-10-25 VITALS
DIASTOLIC BLOOD PRESSURE: 87 MMHG | WEIGHT: 210.31 LBS | SYSTOLIC BLOOD PRESSURE: 136 MMHG | TEMPERATURE: 98 F | OXYGEN SATURATION: 98 % | RESPIRATION RATE: 18 BRPM | BODY MASS INDEX: 37.26 KG/M2 | HEART RATE: 77 BPM | HEIGHT: 63 IN

## 2023-10-25 DIAGNOSIS — R22.42 LOCALIZED SWELLING OF LEFT FOOT: ICD-10-CM

## 2023-10-25 DIAGNOSIS — R22.42 LOCALIZED SWELLING OF LEFT FOOT: Primary | ICD-10-CM

## 2023-10-25 PROCEDURE — 99213 PR OFFICE/OUTPT VISIT, EST, LEVL III, 20-29 MIN: ICD-10-PCS | Mod: S$PBB,,,

## 2023-10-25 PROCEDURE — 73610 X-RAY EXAM OF ANKLE: CPT | Mod: TC,LT

## 2023-10-25 PROCEDURE — 99214 OFFICE O/P EST MOD 30 MIN: CPT | Mod: PBBFAC

## 2023-10-25 PROCEDURE — 73630 X-RAY EXAM OF FOOT: CPT | Mod: TC,LT

## 2023-10-25 PROCEDURE — 99213 OFFICE O/P EST LOW 20 MIN: CPT | Mod: S$PBB,,,

## 2023-10-25 RX ORDER — MELOXICAM 15 MG/1
7.5 TABLET ORAL DAILY
Qty: 5 TABLET | Refills: 0 | Status: SHIPPED | OUTPATIENT
Start: 2023-10-25 | End: 2023-11-29 | Stop reason: ALTCHOICE

## 2023-10-25 NOTE — PROGRESS NOTES
"Subjective:       Patient ID: Gretel Stein is a 38 y.o. female.    Vitals:  height is 5' 3" (1.6 m) and weight is 95.4 kg (210 lb 5.1 oz). Her temperature is 98.2 °F (36.8 °C). Her blood pressure is 136/87 and her pulse is 77. Her respiration is 18 and oxygen saturation is 98%.     Chief Complaint: Foot Pain (LT X 1 1/2 wks)    Left foot pain x 1 week  with swelling denies fall or injuries. Reports hurts worst with ambulation, reports minor relief with Tylenol and Eposom salt.    Foot Pain  This is a new problem. The current episode started in the past 7 days. The problem occurs constantly. The problem has been gradually worsening. Associated symptoms include arthralgias and joint swelling. Pertinent negatives include no chills or fever. The symptoms are aggravated by standing and walking. She has tried lying down, position changes and acetaminophen for the symptoms. The treatment provided mild relief.       Constitution: Negative for chills and fever.   Musculoskeletal:  Positive for joint pain and joint swelling. Negative for pain, trauma and abnormal ROM of joint.   Skin: Negative.    Hematologic/Lymphatic: Negative for history of blood clots.       Objective:      Physical Exam   Constitutional: She is oriented to person, place, and time.   HENT:   Head: Normocephalic and atraumatic.   Cardiovascular: Normal rate, regular rhythm and normal pulses.   Pulmonary/Chest: Effort normal and breath sounds normal.   Abdominal: Normal appearance.   Musculoskeletal:         General: Swelling present. No tenderness, deformity or signs of injury.      Right lower leg: No edema.      Left lower leg: Edema present.      Right foot: Normal.      Left foot: Normal range of motion and normal capillary refill. Swelling present. No tenderness, bony tenderness, crepitus or deformity.      Comments: Mild swelling to left dorsal foot, pedal pulse +2, No TTP, no erythema.   Neurological: no focal deficit. She is alert and oriented to " person, place, and time.   Skin: Skin is warm and dry. Capillary refill takes 2 to 3 seconds.   Nursing note and vitals reviewed.        Assessment:       1. Localized swelling of left foot          Plan:   Your imaging today in clinic are negative. ICE & Elevate extremity.  May take Mobic with Tylenol as needed for pain. Wear ace wrap to help with swelling and comfort. Follow up your PCP if symptoms does not improve. Do not take any other NSAID such as Ibuprofen or Aleve with Mobic. May increase GI bleeds.     Localized swelling of left foot  -     XR FOOT COMPLETE 3 VIEW LEFT; Future; Expected date: 10/25/2023  -     XR ANKLE COMPLETE 3 VIEW LEFT; Future; Expected date: 10/25/2023  -     meloxicam (MOBIC) 15 MG tablet; Take 0.5 tablets (7.5 mg total) by mouth once daily. Discontinue if GI symptoms develop  Dispense: 5 tablet; Refill: 0

## 2023-11-29 ENCOUNTER — OFFICE VISIT (OUTPATIENT)
Dept: FAMILY MEDICINE | Facility: CLINIC | Age: 38
End: 2023-11-29
Payer: MEDICAID

## 2023-11-29 VITALS
TEMPERATURE: 98 F | BODY MASS INDEX: 37.39 KG/M2 | HEART RATE: 82 BPM | WEIGHT: 211 LBS | OXYGEN SATURATION: 96 % | DIASTOLIC BLOOD PRESSURE: 84 MMHG | HEIGHT: 63 IN | SYSTOLIC BLOOD PRESSURE: 138 MMHG | RESPIRATION RATE: 18 BRPM

## 2023-11-29 DIAGNOSIS — O98.419 CHRONIC HEPATITIS B AFFECTING ANTEPARTUM CARE OF MOTHER: Primary | ICD-10-CM

## 2023-11-29 DIAGNOSIS — B18.1 CHRONIC HEPATITIS B AFFECTING ANTEPARTUM CARE OF MOTHER: Primary | ICD-10-CM

## 2023-11-29 DIAGNOSIS — R09.81 COUGH WITH CONGESTION OF PARANASAL SINUS: ICD-10-CM

## 2023-11-29 DIAGNOSIS — R05.8 COUGH WITH CONGESTION OF PARANASAL SINUS: ICD-10-CM

## 2023-11-29 DIAGNOSIS — M79.672 FOOT PAIN, LEFT: ICD-10-CM

## 2023-11-29 LAB
GGT SERPL-CCNC: 69 U/L (ref 9–36)
HAV IGM SERPL QL IA: NONREACTIVE
HBV CORE AB SERPL QL IA: REACTIVE
HBV CORE IGM SERPL QL IA: NONREACTIVE
HBV SURFACE AG SERPL QL IA: REACTIVE
HBV SURFACE AG SERPLBLD QL IA.RAPID: NORMAL
HCV AB SERPL QL IA: NONREACTIVE

## 2023-11-29 PROCEDURE — 3079F DIAST BP 80-89 MM HG: CPT | Mod: CPTII,,, | Performed by: NURSE PRACTITIONER

## 2023-11-29 PROCEDURE — 80074 ACUTE HEPATITIS PANEL: CPT | Performed by: NURSE PRACTITIONER

## 2023-11-29 PROCEDURE — 3008F PR BODY MASS INDEX (BMI) DOCUMENTED: ICD-10-PCS | Mod: CPTII,,, | Performed by: NURSE PRACTITIONER

## 2023-11-29 PROCEDURE — 1159F PR MEDICATION LIST DOCUMENTED IN MEDICAL RECORD: ICD-10-PCS | Mod: CPTII,,, | Performed by: NURSE PRACTITIONER

## 2023-11-29 PROCEDURE — 3008F BODY MASS INDEX DOCD: CPT | Mod: CPTII,,, | Performed by: NURSE PRACTITIONER

## 2023-11-29 PROCEDURE — 1159F MED LIST DOCD IN RCRD: CPT | Mod: CPTII,,, | Performed by: NURSE PRACTITIONER

## 2023-11-29 PROCEDURE — 87341 HEP B SURFACE AG NEUTRLZJ IA: CPT | Performed by: NURSE PRACTITIONER

## 2023-11-29 PROCEDURE — 3075F PR MOST RECENT SYSTOLIC BLOOD PRESS GE 130-139MM HG: ICD-10-PCS | Mod: CPTII,,, | Performed by: NURSE PRACTITIONER

## 2023-11-29 PROCEDURE — 36415 COLL VENOUS BLD VENIPUNCTURE: CPT | Performed by: NURSE PRACTITIONER

## 2023-11-29 PROCEDURE — 3075F SYST BP GE 130 - 139MM HG: CPT | Mod: CPTII,,, | Performed by: NURSE PRACTITIONER

## 2023-11-29 PROCEDURE — 80053 COMPREHEN METABOLIC PANEL: CPT | Performed by: NURSE PRACTITIONER

## 2023-11-29 PROCEDURE — 99214 OFFICE O/P EST MOD 30 MIN: CPT | Mod: PBBFAC | Performed by: NURSE PRACTITIONER

## 2023-11-29 PROCEDURE — 3044F PR MOST RECENT HEMOGLOBIN A1C LEVEL <7.0%: ICD-10-PCS | Mod: CPTII,,, | Performed by: NURSE PRACTITIONER

## 2023-11-29 PROCEDURE — 99214 OFFICE O/P EST MOD 30 MIN: CPT | Mod: S$PBB,,, | Performed by: NURSE PRACTITIONER

## 2023-11-29 PROCEDURE — 82977 ASSAY OF GGT: CPT | Performed by: NURSE PRACTITIONER

## 2023-11-29 PROCEDURE — 3079F PR MOST RECENT DIASTOLIC BLOOD PRESSURE 80-89 MM HG: ICD-10-PCS | Mod: CPTII,,, | Performed by: NURSE PRACTITIONER

## 2023-11-29 PROCEDURE — 86704 HEP B CORE ANTIBODY TOTAL: CPT | Performed by: NURSE PRACTITIONER

## 2023-11-29 PROCEDURE — 99214 PR OFFICE/OUTPT VISIT, EST, LEVL IV, 30-39 MIN: ICD-10-PCS | Mod: S$PBB,,, | Performed by: NURSE PRACTITIONER

## 2023-11-29 PROCEDURE — 3044F HG A1C LEVEL LT 7.0%: CPT | Mod: CPTII,,, | Performed by: NURSE PRACTITIONER

## 2023-11-29 RX ORDER — BENZONATATE 200 MG/1
200 CAPSULE ORAL 3 TIMES DAILY PRN
Qty: 30 CAPSULE | Refills: 0 | Status: SHIPPED | OUTPATIENT
Start: 2023-11-29 | End: 2023-12-09

## 2023-11-29 RX ORDER — CETIRIZINE HYDROCHLORIDE 10 MG/1
10 TABLET ORAL NIGHTLY
Qty: 30 TABLET | Refills: 3 | Status: SHIPPED | OUTPATIENT
Start: 2023-11-29

## 2023-11-29 RX ORDER — FLUTICASONE PROPIONATE 50 MCG
2 SPRAY, SUSPENSION (ML) NASAL DAILY
Qty: 16 G | Refills: 1 | Status: SHIPPED | OUTPATIENT
Start: 2023-11-29

## 2023-11-29 RX ORDER — PREDNISONE 20 MG/1
20 TABLET ORAL 2 TIMES DAILY
Qty: 10 TABLET | Refills: 0 | Status: SHIPPED | OUTPATIENT
Start: 2023-11-29 | End: 2023-12-04

## 2023-11-29 RX ORDER — DICLOFENAC SODIUM 50 MG/1
50 TABLET, DELAYED RELEASE ORAL 3 TIMES DAILY PRN
Qty: 30 TABLET | Refills: 1 | Status: SHIPPED | OUTPATIENT
Start: 2023-11-29

## 2023-11-29 NOTE — ASSESSMENT & PLAN NOTE
Patient state she was seen at urgent care.  On October 25, 2023 patient stated the symptoms started after taking her kids to a ground fair.  State she wore flat slippers and the ground was gravel unsteady and that is when the pain started after coming back home.  X-rays were done.  X-rays reviewed at bedside with patient.  No abnormality was noted to the foot although they noted a heel spur.  Also she was discharged with meloxicam to be taken as needed.  State meloxicam has helped and had eased the pain.  Pain is back, sharp dorsal between 2nd and 3rd metatarsal.  Discussed with patient she strained her foot and because she continue to ambulate in being on her feet all day it has not given the chance for the foot to heal which is very challenging.  Discussed with patient when she gets a chance she can apply ice 20-30 minutes as needed for discomfort, elevate and rest.  Discussed with patient will prescribe diclofenac 50 mg p.o. can be taken up to 3 times a day as needed for pain and to make sure she takes it with food and not to mix with any other NSAIDs such as Advil, ibuprofen, Aleve, naproxen, meloxicam, aspirins.  May take Tylenol if needed for pain and to follow directions on the label.  Also notify patient the prednisone she is taking for her nasal congestion will help with the inflammatory process.  Also advised  patient to avoid flip-flops and to wear more supportive shoes to help stabilize feet such as tennis shoes.  Questions solicited and answered, patient verbalized and agreed to plan.

## 2023-11-29 NOTE — ASSESSMENT & PLAN NOTE
Patient stated the symptoms started during Thanksgiving day with nasal congestion with yellow discharge as well facial pain, headaches, bilateral ear stiffness.  Patient denies any blurry vision or dizziness.  Patient denies fever or nausea or vomiting or body aches.  Also report a cough.  Patient state COVID home test negative.  Also patient denies being pregnant.  Patient is on birth control.  Discussed with patient to stay hydrated with water, Rx prednisone 20 mg p.o. b.i.d. with food for 5 days, discussed side effects of medication patient verbalized.  Rx Flonase use as directed, Rx Zyrtec 10 mg at bedtime, Rx Tessalon Perles 200 mg p.o. t.i.d. p.r.n. as needed for cough.  Questions solicited and answered, patient verbalized and agreed to plan.

## 2023-11-29 NOTE — PROGRESS NOTES
hePatient Name: Gretel Stein   : 1985  MRN: [id number]     SUBJECTIVE DATA:    CHIEF COMPLAINT:   Gretel Stein is a 38 y.o. female who presents to clinic today with Sinus Problem and Foot Pain (Left)      HPI:  38-year-old female presents to the clinic to discuss sinus issues as well ongoing left dorsal foot pain.    Sinus issue:  Patient stated the symptoms started during  day with nasal congestion with yellow discharge as well facial pain, headaches, bilateral ear stiffness.  Patient denies any blurry vision or dizziness.  Patient denies fever or nausea or vomiting or body aches.  Also report a cough.  Patient state COVID home test negative.  Also patient denies being pregnant.  Patient is on birth control.  Discussed with patient to stay hydrated with water, Rx prednisone 20 mg p.o. b.i.d. with food for 5 days, discussed side effects of medication patient verbalized.  Rx Flonase use as directed, Rx Zyrtec 10 mg at bedtime, Rx Tessalon Perles 200 mg p.o. t.i.d. p.r.n. as needed for cough.  Questions solicited and answered, patient verbalized and agreed to plan.    Left foot pain:  Patient state she was seen at urgent care.  On 2023 patient stated the symptoms started after taking her kids to a ground fair.  State she wore flat slippers and the ground was gravel unsteady and that is when the pain started after coming back home.  X-rays were done.  X-rays reviewed at bedside with patient.  No abnormality was noted to the foot although they noted a heel spur.  Also she was discharged with meloxicam to be taken as needed.  State meloxicam has helped and had eased the pain.  Pain is back, sharp dorsal between 2nd and 3rd metatarsal.  Discussed with patient she strained her foot and because she continue to ambulate in being on her feet all day it has not given the chance for the foot to heal which is very challenging.  Discussed with patient when she gets a chance she can apply ice 20-30 minutes  as needed for discomfort, elevate and rest.  Discussed with patient will prescribe diclofenac 50 mg p.o. can be taken up to 3 times a day as needed for pain and to make sure she takes it with food and not to mix with any other NSAIDs such as Advil, ibuprofen, Aleve, naproxen, meloxicam, aspirins.  May take Tylenol if needed for pain and to follow directions on the label.  Also notify patient the prednisone she is taking for her nasal congestion will help with the inflammatory process.  Also advised  patient to avoid flip-flops and to wear more supportive shoes to help stabilize feet such as tennis shoes.  Questions solicited and answered, patient verbalized and agreed to plan.    Patient has a history of hepatitis-B exposure: started on birth because her mother had hepatitis-B.  Mother has been treated for hepatitis-B and also her mother continue to follow-up with [facility]. Patient previously visit denies she is ever been on any treatment.  Liver enzymes were elevated on May 18, 2023 although a year ago there were within normal range.  Alanine aminotransferase 83, aspartate aminotransferase 40.  Patient state she is not much of a alcohol drinker. Discussed with patient would like to draw blood to rule out hepatitis-B infection.  Questions solicited and answered, patient verbalized and agreed to plan.      Patient denies chest pain, shortness of breath, dyspnea on exertion, palpitations, peripheral edema, abdominal pain, nausea, vomiting, diarrhea, constipation, fatigue, fever, chills, dysuria,  hematuria, melena, or hematochezia.       ALLERGIES: Review of patient's allergies indicates:  No Known Allergies      ROS:  Review of Systems   HENT:  Positive for congestion and sinus pain.    Musculoskeletal:  Positive for joint pain (left foot pain).   All other systems reviewed and are negative.        OBJECTIVE DATA:  Vital signs  Vitals:    11/29/23 0842 11/29/23 0928   BP: (!) 139/94 138/84   Pulse:  "82    Resp: 18    Temp: 98.1 °F (36.7 °C)    TempSrc: Oral    SpO2: 96%    Weight: 95.7 kg (211 lb)    Height: 5' 3" (1.6 m)       Body mass index is 37.38 kg/m².    PHYSICAL EXAM:   Physical Exam  Vitals and nursing note reviewed.   Constitutional:       General: She is awake.      Appearance: Normal appearance. She is well-developed and well-groomed.   HENT:      Head: Normocephalic and atraumatic.      Right Ear: Tympanic membrane, ear canal and external ear normal.      Left Ear: Tympanic membrane, ear canal and external ear normal.      Nose: Nose normal.      Mouth/Throat:      Mouth: Mucous membranes are moist.      Pharynx: Oropharynx is clear.   Eyes:      General: Lids are normal.      Extraocular Movements: Extraocular movements intact.      Conjunctiva/sclera: Conjunctivae normal.      Pupils: Pupils are equal, round, and reactive to light.   Cardiovascular:      Rate and Rhythm: Normal rate and regular rhythm.      Pulses: Normal pulses.      Heart sounds: Normal heart sounds.   Pulmonary:      Effort: Pulmonary effort is normal.      Breath sounds: Normal breath sounds.   Abdominal:      General: Bowel sounds are normal.      Palpations: Abdomen is soft.   Musculoskeletal:         General: Normal range of motion.      Cervical back: Normal range of motion and neck supple.   Skin:     General: Skin is warm and dry.      Capillary Refill: Capillary refill takes less than 2 seconds.   Neurological:      General: No focal deficit present.      Mental Status: She is alert and oriented to person, place, and time. Mental status is at baseline.   Psychiatric:         Mood and Affect: Mood normal.         Behavior: Behavior normal. Behavior is cooperative.         Thought Content: Thought content normal.         Judgment: Judgment normal.          ASSESSMENT/PLAN:  1. Chronic hepatitis B affecting antepartum care of mother  Assessment & Plan:  Patient has a history of hepatitis-B exposure: started on birth " because her mother had hepatitis-B.  Mother has been treated for hepatitis-B and also her mother continue to follow-up with [facility]. Patient previously visit denies she is ever been on any treatment.  Liver enzymes were elevated on May 18, 2023 although a year ago there were within normal range.  Alanine aminotransferase 83, aspartate aminotransferase 40.  Discussed with patient would like to draw blood to rule out hepatitis-B infection.  Questions solicited and answered, patient verbalized and agreed to plan.    Orders:  -     Hepatitis Panel, Acute  -     Cancel: Hepatitis B Surface Antigen  -     Hepatitis B Core Antibody, Total  -     Gamma GT  -     Hepatic function panel; Future; Expected date: 11/29/2023  -     Hepatitis B Surface Antigen, Confirmation  -     Pathologist Interpretation  -     Comprehensive Metabolic Panel    2. Cough with congestion of paranasal sinus  Assessment & Plan:  Patient stated the symptoms started during Thanksgiving day with nasal congestion with yellow discharge as well facial pain, headaches, bilateral ear stiffness.  Patient denies any blurry vision or dizziness.  Patient denies fever or nausea or vomiting or body aches.  Also report a cough.  Patient state COVID home test negative.  Also patient denies being pregnant.  Patient is on birth control.  Discussed with patient to stay hydrated with water, Rx prednisone 20 mg p.o. b.i.d. with food for 5 days, discussed side effects of medication patient verbalized.  Rx Flonase use as directed, Rx Zyrtec 10 mg at bedtime, Rx Tessalon Perles 200 mg p.o. t.i.d. p.r.n. as needed for cough.  Questions solicited and answered, patient verbalized and agreed to plan.    Orders:  -     predniSONE (DELTASONE) 20 MG tablet; Take 1 tablet (20 mg total) by mouth 2 (two) times daily. for 5 days  Dispense: 10 tablet; Refill: 0  -     fluticasone propionate (FLONASE) 50 mcg/actuation nasal spray; 2 sprays (100 mcg total) by Each  Nostril route once daily.  Dispense: 16 g; Refill: 1  -     cetirizine (ZYRTEC) 10 MG tablet; Take 1 tablet (10 mg total) by mouth every evening.  Dispense: 30 tablet; Refill: 3  -     benzonatate (TESSALON) 200 MG capsule; Take 1 capsule (200 mg total) by mouth 3 (three) times daily as needed for Cough.  Dispense: 30 capsule; Refill: 0    3. Foot pain, left  Assessment & Plan:   Patient state she was seen at urgent care.  On October 25, 2023 patient stated the symptoms started after taking her kids to a ground fair.  State she wore flat slippers and the ground was gravel unsteady and that is when the pain started after coming back home.  X-rays were done.  X-rays reviewed at bedside with patient.  No abnormality was noted to the foot although they noted a heel spur.  Also she was discharged with meloxicam to be taken as needed.  State meloxicam has helped and had eased the pain.  Pain is back, sharp dorsal between 2nd and 3rd metatarsal.  Discussed with patient she strained her foot and because she continue to ambulate in being on her feet all day it has not given the chance for the foot to heal which is very challenging.  Discussed with patient when she gets a chance she can apply ice 20-30 minutes as needed for discomfort, elevate and rest.  Discussed with patient will prescribe diclofenac 50 mg p.o. can be taken up to 3 times a day as needed for pain and to make sure she takes it with food and not to mix with any other NSAIDs such as Advil, ibuprofen, Aleve, naproxen, meloxicam, aspirins.  May take Tylenol if needed for pain and to follow directions on the label.  Also notify patient the prednisone she is taking for her nasal congestion will help with the inflammatory process.  Also advised  patient to avoid flip-flops and to wear more supportive shoes to help stabilize feet such as tennis shoes.  Questions solicited and answered, patient verbalized and agreed to plan.    Orders:  -     diclofenac (VOLTAREN) 50  MG EC tablet; Take 1 tablet (50 mg total) by mouth 3 (three) times daily as needed (pain).  Dispense: 30 tablet; Refill: 1           RESULTS:  Recent Results (from the past 1008 hour(s))   Hepatitis Panel, Acute    Collection Time: 11/29/23  9:34 AM   Result Value Ref Range    Hepatitis A IgM Nonreactive Nonreactive    Hepatitis B Core IgM Nonreactive Nonreactive    Hepatitis B Surface Antigen Reactive (A) Nonreactive    Hep C Ab Interp Nonreactive Nonreactive   Hepatitis B Core Antibody, Total    Collection Time: 11/29/23  9:34 AM   Result Value Ref Range    Hepatitis B Core Antibody Reactive (A) Nonreactive   Gamma GT    Collection Time: 11/29/23  9:34 AM   Result Value Ref Range    Gamma Glutamyl Transferase 69 (H) 9 - 36 U/L   Hepatitis B Surface Antigen, Confirmation    Collection Time: 11/29/23  9:34 AM   Result Value Ref Range    Hepatitis B Surface Antigen Confirmation Confirmed Positive          Follow Up:  Follow up in about 6 months (around 5/20/2024).     Face to face  including documentation, chart review, counseling, education, review of test results, relevant medical records, and coordination of care.   I have explained the treatment plan, diagnosis, and prognosis to patient. All questions are answered to the best of my knowledge.     Previous medical history/lab work/radiology reviewed and considered during medical management decisions.   Medication list reviewed and medication reconciliation performed.  Patient was provided  and care about his/her current diagnosis (es) and medications including risk/benefit and side effects/adverse events, over the counter medication uses/doses, home self-care and contact precautions,  and red flags and indications for when to seek immediate medical attention.   Patient was advised to continue compliance with current medication list and medical recommendations.  Patient dvised continued compliance with recommended eating habits/ diets for medical conditions  and exercise 150 minutes/ week (if possible) for medical condition (s).  Educational handouts and instructions on selected disease management in AVS (After Visit Summary).    All of the patient's questions were answered to patient's satisfaction.   The patient was receptive, expressed verbal understanding and agreement the above plan.       This note was created with the assistance of a voice recognition software or phone dictation. There may be transcription errors as a result of using this technology however minimal. Effort has been made to assure accuracy of transcription but any obvious errors or omissions should be clarified with the author of the document

## 2023-11-29 NOTE — ASSESSMENT & PLAN NOTE
Patient has a history of hepatitis-B exposure: started on birth because her mother had hepatitis-B.  Mother has been treated for hepatitis-B and also her mother continue to follow-up with [facility]. Patient previously visit denies she is ever been on any treatment.  Liver enzymes were elevated on May 18, 2023 although a year ago there were within normal range.  Alanine aminotransferase 83, aspartate aminotransferase 40.  Discussed with patient would like to draw blood to rule out hepatitis-B infection.  Questions solicited and answered, patient verbalized and agreed to plan.

## 2023-11-30 LAB
ALBUMIN SERPL-MCNC: 4.5 G/DL (ref 3.5–5)
ALBUMIN/GLOB SERPL: 1.2 RATIO (ref 1.1–2)
ALP SERPL-CCNC: 52 UNIT/L (ref 40–150)
ALT SERPL-CCNC: 90 UNIT/L (ref 0–55)
AST SERPL-CCNC: 56 UNIT/L (ref 5–34)
BILIRUB SERPL-MCNC: 0.6 MG/DL
BUN SERPL-MCNC: 9.7 MG/DL (ref 7–18.7)
CALCIUM SERPL-MCNC: 9.5 MG/DL (ref 8.4–10.2)
CHLORIDE SERPL-SCNC: 104 MMOL/L (ref 98–107)
CO2 SERPL-SCNC: 23 MMOL/L (ref 22–29)
CREAT SERPL-MCNC: 0.75 MG/DL (ref 0.55–1.02)
GFR SERPLBLD CREATININE-BSD FMLA CKD-EPI: >60 MLS/MIN/1.73/M2
GLOBULIN SER-MCNC: 3.9 GM/DL (ref 2.4–3.5)
GLUCOSE SERPL-MCNC: 62 MG/DL (ref 74–100)
POTASSIUM SERPL-SCNC: 4.4 MMOL/L (ref 3.5–5.1)
PROT SERPL-MCNC: 8.4 GM/DL (ref 6.4–8.3)
SODIUM SERPL-SCNC: 139 MMOL/L (ref 136–145)

## 2023-11-30 NOTE — PROGRESS NOTES
Spoke to patient over the phone, patient id self and accurate date of birth.   Discussed blood work In detail.  Patient agreed to return to clinic for blood work for hepatitis-B DNA and hepatitis-B E antibody and antigen. Questions solicited and answered, patient verbalized and agreed to plan.

## 2023-12-01 ENCOUNTER — CLINICAL SUPPORT (OUTPATIENT)
Dept: FAMILY MEDICINE | Facility: CLINIC | Age: 38
End: 2023-12-01
Payer: MEDICAID

## 2023-12-01 DIAGNOSIS — O98.419 CHRONIC HEPATITIS B AFFECTING ANTEPARTUM CARE OF MOTHER: Primary | ICD-10-CM

## 2023-12-01 DIAGNOSIS — B18.1 CHRONIC HEPATITIS B AFFECTING ANTEPARTUM CARE OF MOTHER: Primary | ICD-10-CM

## 2023-12-01 LAB
ALBUMIN SERPL-MCNC: 4.2 G/DL (ref 3.5–5)
ALP SERPL-CCNC: 49 UNIT/L (ref 40–150)
ALT SERPL-CCNC: 72 UNIT/L (ref 0–55)
AST SERPL-CCNC: 39 UNIT/L (ref 5–34)
BILIRUB SERPL-MCNC: 0.5 MG/DL
BILIRUBIN DIRECT+TOT PNL SERPL-MCNC: 0.2 MG/DL (ref 0–?)
BILIRUBIN DIRECT+TOT PNL SERPL-MCNC: 0.3 MG/DL (ref 0–0.8)
PROT SERPL-MCNC: 8 GM/DL (ref 6.4–8.3)

## 2023-12-01 PROCEDURE — 87517 HEPATITIS B DNA QUANT: CPT

## 2023-12-01 PROCEDURE — 36415 COLL VENOUS BLD VENIPUNCTURE: CPT

## 2023-12-01 PROCEDURE — 80076 HEPATIC FUNCTION PANEL: CPT

## 2023-12-01 NOTE — PROGRESS NOTES
Patient came in for blood draw. Patient identified by name and . Patient's lab drawn, labeled and sent to lab. Patient tolerated well.

## 2023-12-03 LAB — HBV DNA SERPL NAA+PROBE-ACNC: <10 IU/ML

## 2023-12-04 DIAGNOSIS — B18.1 CHRONIC VIRAL HEPATITIS B WITHOUT DELTA AGENT AND WITHOUT COMA: Primary | ICD-10-CM

## 2023-12-04 PROBLEM — B18.9 CHRONIC VIRAL HEPATITIS: Status: ACTIVE | Noted: 2023-12-04

## 2023-12-05 ENCOUNTER — TELEPHONE (OUTPATIENT)
Dept: FAMILY MEDICINE | Facility: CLINIC | Age: 38
End: 2023-12-05
Payer: MEDICAID

## 2023-12-05 NOTE — TELEPHONE ENCOUNTER
Called patient to give results. Patient verbalized understanding. No additional questions at this time.

## 2023-12-05 NOTE — TELEPHONE ENCOUNTER
----- Message from JOSE Sweet sent at 12/4/2023  7:30 AM CST -----  Please notify patient she has been referred to [facility] as discussed.

## 2023-12-11 ENCOUNTER — TELEPHONE (OUTPATIENT)
Dept: INFECTIOUS DISEASES | Facility: CLINIC | Age: 38
End: 2023-12-11
Payer: MEDICAID

## 2023-12-11 NOTE — TELEPHONE ENCOUNTER
----- Message from Panchito Griffin LPN sent at 12/11/2023  7:34 AM CST -----  Regarding: FW: New Hep C Labs    ----- Message -----  From: Linette Obando  Sent: 12/8/2023   3:24 PM CST  To: #  Subject: New Hep C Labs                                   Chandni New Hep C Pt -[facility]     Pt is scheduled for a New Hep C appt on 1/22 with Chandni.

## 2023-12-11 NOTE — TELEPHONE ENCOUNTER
On review of chart, hep C is non-reactive, hep B surface antigen is positive and hep B core antibody is reactive.  Will change to referral for hep B.

## 2024-01-22 ENCOUNTER — OFFICE VISIT (OUTPATIENT)
Dept: INFECTIOUS DISEASES | Facility: CLINIC | Age: 39
End: 2024-01-22
Payer: MEDICAID

## 2024-01-22 VITALS
SYSTOLIC BLOOD PRESSURE: 109 MMHG | HEIGHT: 63 IN | WEIGHT: 209.81 LBS | TEMPERATURE: 98 F | BODY MASS INDEX: 37.18 KG/M2 | HEART RATE: 72 BPM | DIASTOLIC BLOOD PRESSURE: 74 MMHG | RESPIRATION RATE: 18 BRPM

## 2024-01-22 DIAGNOSIS — Z23 NEED FOR VACCINATION: ICD-10-CM

## 2024-01-22 DIAGNOSIS — B18.1 CHRONIC HEPATITIS B: Primary | ICD-10-CM

## 2024-01-22 DIAGNOSIS — B18.1 CHRONIC VIRAL HEPATITIS B WITHOUT DELTA AGENT AND WITHOUT COMA: ICD-10-CM

## 2024-01-22 LAB
AFP-TM SERPL-MCNC: 5.5 NG/ML
ALBUMIN SERPL-MCNC: 4.1 G/DL (ref 3.5–5)
ALBUMIN/GLOB SERPL: 1.2 RATIO (ref 1.1–2)
ALP SERPL-CCNC: 49 UNIT/L (ref 40–150)
ALT SERPL-CCNC: 59 UNIT/L (ref 0–55)
AST SERPL-CCNC: 35 UNIT/L (ref 5–34)
BASOPHILS # BLD AUTO: 0.03 X10(3)/MCL
BASOPHILS NFR BLD AUTO: 0.6 %
BILIRUB SERPL-MCNC: 0.5 MG/DL
BUN SERPL-MCNC: 10.1 MG/DL (ref 7–18.7)
CALCIUM SERPL-MCNC: 9.1 MG/DL (ref 8.4–10.2)
CHLORIDE SERPL-SCNC: 106 MMOL/L (ref 98–107)
CO2 SERPL-SCNC: 25 MMOL/L (ref 22–29)
CREAT SERPL-MCNC: 0.68 MG/DL (ref 0.55–1.02)
EOSINOPHIL # BLD AUTO: 0.32 X10(3)/MCL (ref 0–0.9)
EOSINOPHIL NFR BLD AUTO: 6.1 %
ERYTHROCYTE [DISTWIDTH] IN BLOOD BY AUTOMATED COUNT: 12 % (ref 11.5–17)
GFR SERPLBLD CREATININE-BSD FMLA CKD-EPI: >60 MLS/MIN/1.73/M2
GLOBULIN SER-MCNC: 3.4 GM/DL (ref 2.4–3.5)
GLUCOSE SERPL-MCNC: 88 MG/DL (ref 74–100)
HAV AB SER QL IA: NONREACTIVE
HCT VFR BLD AUTO: 42.1 % (ref 37–47)
HGB BLD-MCNC: 14.3 G/DL (ref 12–16)
HIV 1+2 AB+HIV1 P24 AG SERPL QL IA: NONREACTIVE
IMM GRANULOCYTES # BLD AUTO: 0.03 X10(3)/MCL (ref 0–0.04)
IMM GRANULOCYTES NFR BLD AUTO: 0.6 %
INR PPP: 1
LYMPHOCYTES # BLD AUTO: 1.55 X10(3)/MCL (ref 0.6–4.6)
LYMPHOCYTES NFR BLD AUTO: 29.5 %
MCH RBC QN AUTO: 31 PG (ref 27–31)
MCHC RBC AUTO-ENTMCNC: 34 G/DL (ref 33–36)
MCV RBC AUTO: 91.1 FL (ref 80–94)
MONOCYTES # BLD AUTO: 0.39 X10(3)/MCL (ref 0.1–1.3)
MONOCYTES NFR BLD AUTO: 7.4 %
NEUTROPHILS # BLD AUTO: 2.94 X10(3)/MCL (ref 2.1–9.2)
NEUTROPHILS NFR BLD AUTO: 55.8 %
NRBC BLD AUTO-RTO: 0 %
PLATELET # BLD AUTO: 291 X10(3)/MCL (ref 130–400)
PMV BLD AUTO: 9.5 FL (ref 7.4–10.4)
POTASSIUM SERPL-SCNC: 3.5 MMOL/L (ref 3.5–5.1)
PROT SERPL-MCNC: 7.5 GM/DL (ref 6.4–8.3)
PROTHROMBIN TIME: 13 SECONDS (ref 11.4–14)
RBC # BLD AUTO: 4.62 X10(6)/MCL (ref 4.2–5.4)
SODIUM SERPL-SCNC: 139 MMOL/L (ref 136–145)
T PALLIDUM AB SER QL: NONREACTIVE
WBC # SPEC AUTO: 5.26 X10(3)/MCL (ref 4.5–11.5)

## 2024-01-22 PROCEDURE — 1160F RVW MEDS BY RX/DR IN RCRD: CPT | Mod: CPTII,,, | Performed by: NURSE PRACTITIONER

## 2024-01-22 PROCEDURE — 87523 HEPATITIS D QUANTIFICATION: CPT | Performed by: NURSE PRACTITIONER

## 2024-01-22 PROCEDURE — 87350 HEPATITIS BE AG IA: CPT | Performed by: NURSE PRACTITIONER

## 2024-01-22 PROCEDURE — 90686 IIV4 VACC NO PRSV 0.5 ML IM: CPT | Mod: PBBFAC

## 2024-01-22 PROCEDURE — 82105 ALPHA-FETOPROTEIN SERUM: CPT | Performed by: NURSE PRACTITIONER

## 2024-01-22 PROCEDURE — 80053 COMPREHEN METABOLIC PANEL: CPT | Performed by: NURSE PRACTITIONER

## 2024-01-22 PROCEDURE — 85610 PROTHROMBIN TIME: CPT | Performed by: NURSE PRACTITIONER

## 2024-01-22 PROCEDURE — 99214 OFFICE O/P EST MOD 30 MIN: CPT | Mod: PBBFAC | Performed by: NURSE PRACTITIONER

## 2024-01-22 PROCEDURE — 90471 IMMUNIZATION ADMIN: CPT | Mod: PBBFAC

## 2024-01-22 PROCEDURE — 99204 OFFICE O/P NEW MOD 45 MIN: CPT | Mod: S$PBB,,, | Performed by: NURSE PRACTITIONER

## 2024-01-22 PROCEDURE — 86708 HEPATITIS A ANTIBODY: CPT | Performed by: NURSE PRACTITIONER

## 2024-01-22 PROCEDURE — 36415 COLL VENOUS BLD VENIPUNCTURE: CPT | Performed by: NURSE PRACTITIONER

## 2024-01-22 PROCEDURE — 86780 TREPONEMA PALLIDUM: CPT | Performed by: NURSE PRACTITIONER

## 2024-01-22 PROCEDURE — 3008F BODY MASS INDEX DOCD: CPT | Mod: CPTII,,, | Performed by: NURSE PRACTITIONER

## 2024-01-22 PROCEDURE — 3074F SYST BP LT 130 MM HG: CPT | Mod: CPTII,,, | Performed by: NURSE PRACTITIONER

## 2024-01-22 PROCEDURE — 3078F DIAST BP <80 MM HG: CPT | Mod: CPTII,,, | Performed by: NURSE PRACTITIONER

## 2024-01-22 PROCEDURE — 86707 HEPATITIS BE ANTIBODY: CPT | Performed by: NURSE PRACTITIONER

## 2024-01-22 PROCEDURE — 1159F MED LIST DOCD IN RCRD: CPT | Mod: CPTII,,, | Performed by: NURSE PRACTITIONER

## 2024-01-22 PROCEDURE — 85025 COMPLETE CBC W/AUTO DIFF WBC: CPT | Performed by: NURSE PRACTITIONER

## 2024-01-22 PROCEDURE — 87389 HIV-1 AG W/HIV-1&-2 AB AG IA: CPT | Performed by: NURSE PRACTITIONER

## 2024-01-22 RX ADMIN — INFLUENZA VIRUS VACCINE 0.5 ML: 15; 15; 15; 15 SUSPENSION INTRAMUSCULAR at 11:01

## 2024-01-22 NOTE — PROGRESS NOTES
Patient ID: Gretel Stein 38 y.o.     Chief Complaint:   Chief Complaint   Patient presents with    New Hep B     Denies symptoms        HPI:    1/22/24  Gretel is a 39 yo  Female here today for HBV evaluation, referred by PCP MEMO Avery NP.  She tells me that she was initially diagnosed at birth, mother with Hep B. She is treatment naive, but most recently had an increase in LFTs over past 6 months or so. She denies jaundice, icterus, nausea, vomiting, dark urine, or omid colored stools.  She generally has only a couple of drinks maybe twice a month, but tells me that she did have some increased stressors over the past 6 months or so and was going out with friends more often than usual and drinking more alcohol. Last drink 2 days ago, 2 beer. Rarely takes Tylenol. On OCP since 2021. No new sexual partners. Has not had RUQ abd u/s done, will order. Amenable to flu vaccine today. All questions answered & concerns addressed.             Past Medical History:   Diagnosis Date    Chronic hepatitis B     Since birth according to patient.    Mild intermittent asthma, uncomplicated         Past Surgical History:   Procedure Laterality Date    ADENOIDECTOMY      HERNIA REPAIR      TONSILLECTOMY          Social History     Socioeconomic History    Marital status: Single   Tobacco Use    Smoking status: Every Day     Types: Vaping with nicotine    Smokeless tobacco: Never   Substance and Sexual Activity    Alcohol use: Yes     Comment: Socially    Drug use: Never    Sexual activity: Yes     Partners: Male     Birth control/protection: Condom, Pill     Social Determinants of Health     Financial Resource Strain: Low Risk  (5/18/2023)    Overall Financial Resource Strain (CARDIA)     Difficulty of Paying Living Expenses: Not hard at all   Food Insecurity: No Food Insecurity (5/18/2023)    Hunger Vital Sign     Worried About Running Out of Food in the Last Year: Never true     Ran Out of Food in the Last Year: Never true    Transportation Needs: No Transportation Needs (5/18/2023)    PRAPARE - Transportation     Lack of Transportation (Medical): No     Lack of Transportation (Non-Medical): No   Physical Activity: Sufficiently Active (5/18/2023)    Exercise Vital Sign     Days of Exercise per Week: 4 days     Minutes of Exercise per Session: 40 min   Stress: No Stress Concern Present (5/18/2023)    Citizen of [place] of Occupational Health - Occupational Stress Questionnaire     Feeling of Stress : Not at all   Social Connections: Moderately Integrated (5/18/2023)    Social Connection and Isolation Panel [NHANES]     Frequency of Communication with Friends and Family: Twice a week     Frequency of Social Gatherings with Friends and Family: Twice a week     Attends Yazidi Services: 1 to 4 times per year     Active Member of Clubs or Organizations: No     Attends Club or Organization Meetings: 1 to 4 times per year     Marital Status: Never    Housing Stability: Low Risk  (5/18/2023)    Housing Stability Vital Sign     Unable to Pay for Housing in the Last Year: No     Number of Places Lived in the Last Year: 1     Unstable Housing in the Last Year: No        Family History   Problem Relation Age of Onset    Hepatitis Mother         B    Hepatitis Father         B        Review of patient's allergies indicates:  No Known Allergies     Immunization History   Administered Date(s) Administered    COVID-19, MRNA, LN-S, PF (Pfizer) (Purple Cap) 08/15/2021, 09/05/2021    Influenza - Quadrivalent - PF *Preferred* (6 months and older) 10/08/2019, 09/16/2021, 01/22/2024    Influenza A (H1N1) 2009 Monovalent - Intranasal 11/18/2009    Meningococcal Conjugate (MCV4P) 02/15/2006    Pneumococcal Conjugate - 20 Valent 05/18/2023    Tdap 10/08/2019        Review of Systems   Constitutional: Negative.    HENT: Negative.     Eyes: Negative.    Respiratory: Negative.     Cardiovascular: Negative.    Gastrointestinal: Negative.    Genitourinary:  "Negative.    Musculoskeletal: Negative.    Skin: Negative.    Neurological: Negative.    Endo/Heme/Allergies: Negative.    Psychiatric/Behavioral: Negative.     All other systems reviewed and are negative.         Objective:      /74 (BP Location: Left arm, Patient Position: Sitting, BP Method: Large (Automatic))   Pulse 72   Temp 98.1 °F (36.7 °C) (Oral)   Resp 18   Ht 5' 3" (1.6 m)   Wt 95.2 kg (209 lb 12.8 oz)   BMI 37.16 kg/m²      Physical Exam  Vitals reviewed.   Constitutional:       General: She is not in acute distress.     Appearance: Normal appearance. She is not toxic-appearing.   Eyes:      General: No scleral icterus.  Cardiovascular:      Rate and Rhythm: Normal rate and regular rhythm.      Heart sounds: Normal heart sounds.   Pulmonary:      Effort: Pulmonary effort is normal. No respiratory distress.      Breath sounds: Normal breath sounds.   Abdominal:      General: Bowel sounds are normal. There is no distension.      Palpations: Abdomen is soft. There is no mass.      Tenderness: There is no abdominal tenderness.   Musculoskeletal:         General: Normal range of motion.   Skin:     General: Skin is warm and dry.   Neurological:      Mental Status: She is alert and oriented to person, place, and time.          Labs:   Lab Results   Component Value Date    WBC 5.48 05/18/2023    HGB 14.4 05/18/2023    HCT 41.9 05/18/2023    MCV 90.3 05/18/2023     05/18/2023       CMP  Sodium Level   Date Value Ref Range Status   11/29/2023 139 136 - 145 mmol/L Final     Comment:     Blood in lab.     Potassium Level   Date Value Ref Range Status   11/29/2023 4.4 3.5 - 5.1 mmol/L Final     Comment:     Blood in lab.     Carbon Dioxide   Date Value Ref Range Status   11/29/2023 23 22 - 29 mmol/L Final     Comment:     Blood in lab.     Blood Urea Nitrogen   Date Value Ref Range Status   11/29/2023 9.7 7.0 - 18.7 mg/dL Final     Comment:     Blood in lab.     Creatinine   Date Value Ref Range " Status   11/29/2023 0.75 0.55 - 1.02 mg/dL Final     Comment:     Blood in lab.     Calcium Level Total   Date Value Ref Range Status   11/29/2023 9.5 8.4 - 10.2 mg/dL Final     Comment:     Blood in lab.     Albumin Level   Date Value Ref Range Status   12/01/2023 4.2 3.5 - 5.0 g/dL Final     Bilirubin Total   Date Value Ref Range Status   12/01/2023 0.5 <=1.5 mg/dL Final     Alkaline Phosphatase   Date Value Ref Range Status   12/01/2023 49 40 - 150 unit/L Final     Aspartate Aminotransferase   Date Value Ref Range Status   12/01/2023 39 (H) 5 - 34 unit/L Final     Alanine Aminotransferase   Date Value Ref Range Status   12/01/2023 72 (H) 0 - 55 unit/L Final     eGFR   Date Value Ref Range Status   11/29/2023 >60 mls/min/1.73/m2 Final     Lab Results   Component Value Date    TSH 1.364 05/18/2023     HIV   Date Value Ref Range Status   05/18/2023 Nonreactive Nonreactive Final     Hepatitis B Surface Antigen   Date Value Ref Range Status   11/29/2023 Reactive (A) Nonreactive Final     Hep C Ab Interp   Date Value Ref Range Status   11/29/2023 Nonreactive Nonreactive Final     Hepatitis B Surface Antigen Confirmation   Date Value Ref Range Status   11/29/2023 Confirmed Positive  Final     No results found for this or any previous visit.  No results found for this or any previous visit.  Results for orders placed or performed in visit on 12/01/23   Hepatitis B DNA, Quant   Result Value Ref Range    HBV DNA Detect/Quant <10 (A) Undetected IU/mL       Imaging: Reviewed most recent relevant imaging studies available, notable results highlighted in this note      Medications:     Current Outpatient Medications   Medication Instructions    cetirizine (ZYRTEC) 10 mg, Oral, Nightly    diclofenac (VOLTAREN) 50 mg, Oral, 3 times daily PRN    fluticasone propionate (FLONASE) 100 mcg, Each Nostril, Daily    norethindrone (MICRONOR) 0.35 mg tablet 1 tablet, Oral, Daily       Assessment:       Problem List Items Addressed This  Visit          GI    Chronic viral hepatitis     Other Visit Diagnoses       Chronic hepatitis B    -  Primary    Relevant Orders    AFP Tumor Marker    CBC Auto Differential    Comprehensive Metabolic Panel    Protime-INR    Hepatitis A antibody, IgG    Hepatitis B DNA, Quant    Hepatitis B e antibody    Hepatitis B e Antigen    HIV 1/2 Ag/Ab (4th Gen)    SYPHILIS ANTIBODY (WITH REFLEX RPR)    US Abdomen Limited    Need for vaccination        Relevant Medications    influenza (QUADRIVALENT PF) vaccine 0.5 mL (Completed)               Plan:      Chronic hepatitis B  -     AFP Tumor Marker; Future; Expected date: 01/22/2024  -     CBC Auto Differential; Future; Expected date: 01/22/2024  -     Comprehensive Metabolic Panel  -     Protime-INR; Future; Expected date: 01/22/2024  -     Hepatitis A antibody, IgG; Future; Expected date: 01/22/2024  -     Hepatitis B DNA, Quant; Future; Expected date: 01/22/2024  -     Hepatitis B e antibody; Future; Expected date: 01/22/2024  -     Hepatitis B e Antigen; Future; Expected date: 01/22/2024  -     HIV 1/2 Ag/Ab (4th Gen); Future; Expected date: 01/22/2024  -     SYPHILIS ANTIBODY (WITH REFLEX RPR); Future; Expected date: 01/22/2024  -     US Abdomen Limited; Future; Expected date: 02/05/2024  Diagnosed at birth, mother HBV + & on treatment.  Treatment naive, recent increase in LFTs.   Repeat labs today.   Avoid alcohol, minimize acetaminophen use.  RUQ abd u/s within 2-4 weeks.  Blood & sex precautions.   RTC 4-6 weeks with Chandni, Mon or Tues appt.     Need for vaccination  -     influenza (QUADRIVALENT PF) vaccine 0.5 mL  Flu vaccine today.

## 2024-01-22 NOTE — PROGRESS NOTES
Influenza Vaccination given IM left deltoid using aseptic tech. Patient tolerated well. To contact clinic prn.

## 2024-01-23 LAB
HBV DNA SERPL NAA+PROBE-ACNC: <10 IU/ML
HBV E AG SERPL QL IA: NEGATIVE
HBV E IGG SER QL IA: POSITIVE

## 2024-01-29 ENCOUNTER — HOSPITAL ENCOUNTER (OUTPATIENT)
Dept: RADIOLOGY | Facility: HOSPITAL | Age: 39
Discharge: HOME | End: 2024-01-29
Attending: NURSE PRACTITIONER
Payer: MEDICAID

## 2024-01-29 DIAGNOSIS — B18.1 CHRONIC HEPATITIS B: ICD-10-CM

## 2024-01-29 PROCEDURE — 76705 ECHO EXAM OF ABDOMEN: CPT | Mod: TC

## 2024-03-05 ENCOUNTER — OFFICE VISIT (OUTPATIENT)
Dept: INFECTIOUS DISEASES | Facility: CLINIC | Age: 39
End: 2024-03-05
Payer: MEDICAID

## 2024-03-05 VITALS
HEART RATE: 74 BPM | DIASTOLIC BLOOD PRESSURE: 77 MMHG | SYSTOLIC BLOOD PRESSURE: 115 MMHG | WEIGHT: 203.88 LBS | BODY MASS INDEX: 36.12 KG/M2 | HEIGHT: 63 IN | TEMPERATURE: 98 F | RESPIRATION RATE: 16 BRPM

## 2024-03-05 DIAGNOSIS — B18.1 CHRONIC HEPATITIS B: Primary | ICD-10-CM

## 2024-03-05 DIAGNOSIS — Z23 NEED FOR VACCINATION: ICD-10-CM

## 2024-03-05 PROCEDURE — 3078F DIAST BP <80 MM HG: CPT | Mod: CPTII,,, | Performed by: NURSE PRACTITIONER

## 2024-03-05 PROCEDURE — 3074F SYST BP LT 130 MM HG: CPT | Mod: CPTII,,, | Performed by: NURSE PRACTITIONER

## 2024-03-05 PROCEDURE — 99214 OFFICE O/P EST MOD 30 MIN: CPT | Mod: PBBFAC | Performed by: NURSE PRACTITIONER

## 2024-03-05 PROCEDURE — 3008F BODY MASS INDEX DOCD: CPT | Mod: CPTII,,, | Performed by: NURSE PRACTITIONER

## 2024-03-05 PROCEDURE — 90632 HEPA VACCINE ADULT IM: CPT | Mod: PBBFAC

## 2024-03-05 PROCEDURE — 90471 IMMUNIZATION ADMIN: CPT | Mod: PBBFAC

## 2024-03-05 PROCEDURE — 99214 OFFICE O/P EST MOD 30 MIN: CPT | Mod: S$PBB,,, | Performed by: NURSE PRACTITIONER

## 2024-03-05 PROCEDURE — 1160F RVW MEDS BY RX/DR IN RCRD: CPT | Mod: CPTII,,, | Performed by: NURSE PRACTITIONER

## 2024-03-05 PROCEDURE — 1159F MED LIST DOCD IN RCRD: CPT | Mod: CPTII,,, | Performed by: NURSE PRACTITIONER

## 2024-03-05 RX ADMIN — HEPATITIS A VACCINE 1440 UNITS: 1440 INJECTION, SUSPENSION INTRAMUSCULAR at 10:03

## 2024-03-05 NOTE — PROGRESS NOTES
Patient ID: Gretel Stein 38 y.o.     Chief Complaint:   Chief Complaint   Patient presents with    Followup Hep B     States some sinus congestion        HPI:    3/5/24  Gretel is a 39 yo  Female presenting today for HBV f/u.  Labs collected 1/22/24. Hep B VL <10, AST 35, ALT 59, Creatinine 0.68, AFP 5.50. She is not immune to Hep A, negative for Hep C, HIV & syphilis. RUQ abdominal u/s consistent with steatosis. She has already adjusted diet, cut back on alcohol, started exercise routine. Encouraged to continue same. She is amenable to Hep A vaccination, will give 1st dose today. All questions answered & concerns addressed.    1/22/24  Gretel is a 39 yo  Female here today for HBV evaluation, referred by PCP MEMO Avery NP.  She tells me that she was initially diagnosed at birth, mother with Hep B. She is treatment naive, but most recently had an increase in LFTs over past 6 months or so. She denies jaundice, icterus, nausea, vomiting, dark urine, or omid colored stools.  She generally has only a couple of drinks maybe twice a month, but tells me that she did have some increased stressors over the past 6 months or so and was going out with friends more often than usual and drinking more alcohol. Last drink 2 days ago, 2 beer. Rarely takes Tylenol. On OCP since 2021. No new sexual partners. Has not had RUQ abd u/s done, will order. Amenable to flu vaccine today. All questions answered & concerns addressed.           Past Medical History:   Diagnosis Date    Chronic hepatitis B     Since birth according to patient.    Mild intermittent asthma, uncomplicated         Past Surgical History:   Procedure Laterality Date    ADENOIDECTOMY      HERNIA REPAIR      TONSILLECTOMY          Social History     Socioeconomic History    Marital status: Single   Tobacco Use    Smoking status: Every Day     Types: Vaping with nicotine    Smokeless tobacco: Never   Substance and Sexual Activity    Alcohol use: Yes     Comment:  Socially    Drug use: Never    Sexual activity: Yes     Partners: Male     Birth control/protection: Condom, Pill     Social Determinants of Health     Financial Resource Strain: Low Risk  (5/18/2023)    Overall Financial Resource Strain (CARDIA)     Difficulty of Paying Living Expenses: Not hard at all   Food Insecurity: No Food Insecurity (5/18/2023)    Hunger Vital Sign     Worried About Running Out of Food in the Last Year: Never true     Ran Out of Food in the Last Year: Never true   Transportation Needs: No Transportation Needs (5/18/2023)    PRAPARE - Transportation     Lack of Transportation (Medical): No     Lack of Transportation (Non-Medical): No   Physical Activity: Sufficiently Active (5/18/2023)    Exercise Vital Sign     Days of Exercise per Week: 4 days     Minutes of Exercise per Session: 40 min   Stress: No Stress Concern Present (5/18/2023)    [place] of Occupational Health - Occupational Stress Questionnaire     Feeling of Stress : Not at all   Social Connections: Moderately Integrated (5/18/2023)    Social Connection and Isolation Panel [NHANES]     Frequency of Communication with Friends and Family: Twice a week     Frequency of Social Gatherings with Friends and Family: Twice a week     Attends Congregation Services: 1 to 4 times per year     Active Member of Clubs or Organizations: No     Attends Club or Organization Meetings: 1 to 4 times per year     Marital Status: Never    Housing Stability: Low Risk  (5/18/2023)    Housing Stability Vital Sign     Unable to Pay for Housing in the Last Year: No     Number of Places Lived in the Last Year: 1     Unstable Housing in the Last Year: No        Family History   Problem Relation Age of Onset    Hepatitis Mother         B    Hepatitis Father         B        Review of patient's allergies indicates:  No Known Allergies     Immunization History   Administered Date(s) Administered    COVID-19, MRNA, LN-S, PF (Pfizer) (Purple Cap)  "08/15/2021, 09/05/2021    Influenza - Quadrivalent - PF *Preferred* (6 months and older) 10/08/2019, 09/16/2021, 01/22/2024    Influenza A (H1N1) 2009 Monovalent - Intranasal 11/18/2009    Meningococcal Conjugate (MCV4P) 02/15/2006    Pneumococcal Conjugate - 20 Valent 05/18/2023    Tdap 10/08/2019        Review of Systems   Constitutional: Negative.    HENT: Negative.     Eyes: Negative.    Respiratory: Negative.     Cardiovascular: Negative.    Gastrointestinal: Negative.    Genitourinary: Negative.    Musculoskeletal: Negative.    Skin: Negative.    Neurological: Negative.    Endo/Heme/Allergies: Negative.    Psychiatric/Behavioral: Negative.     All other systems reviewed and are negative.         Objective:      /77 (BP Location: Right arm, Patient Position: Sitting, BP Method: Large (Automatic))   Pulse 74   Temp 98.4 °F (36.9 °C) (Oral)   Resp 16   Ht 5' 3" (1.6 m)   Wt 92.5 kg (203 lb 14.4 oz)   BMI 36.12 kg/m²      Physical Exam  Vitals reviewed.   Constitutional:       General: She is not in acute distress.     Appearance: Normal appearance. She is not toxic-appearing.   Eyes:      General: No scleral icterus.  Cardiovascular:      Rate and Rhythm: Normal rate and regular rhythm.      Heart sounds: Normal heart sounds.   Pulmonary:      Effort: Pulmonary effort is normal. No respiratory distress.      Breath sounds: Normal breath sounds.   Abdominal:      General: Bowel sounds are normal. There is no distension.      Palpations: Abdomen is soft. There is no mass.      Tenderness: There is no abdominal tenderness.   Musculoskeletal:         General: Normal range of motion.   Skin:     General: Skin is warm and dry.   Neurological:      Mental Status: She is alert and oriented to person, place, and time.          Labs:   Lab Results   Component Value Date    WBC 5.26 01/22/2024    HGB 14.3 01/22/2024    HCT 42.1 01/22/2024    MCV 91.1 01/22/2024     01/22/2024       CMP  Sodium Level "   Date Value Ref Range Status   01/22/2024 139 136 - 145 mmol/L Final     Potassium Level   Date Value Ref Range Status   01/22/2024 3.5 3.5 - 5.1 mmol/L Final     Carbon Dioxide   Date Value Ref Range Status   01/22/2024 25 22 - 29 mmol/L Final     Blood Urea Nitrogen   Date Value Ref Range Status   01/22/2024 10.1 7.0 - 18.7 mg/dL Final     Creatinine   Date Value Ref Range Status   01/22/2024 0.68 0.55 - 1.02 mg/dL Final     Calcium Level Total   Date Value Ref Range Status   01/22/2024 9.1 8.4 - 10.2 mg/dL Final     Albumin Level   Date Value Ref Range Status   01/22/2024 4.1 3.5 - 5.0 g/dL Final     Bilirubin Total   Date Value Ref Range Status   01/22/2024 0.5 <=1.5 mg/dL Final     Alkaline Phosphatase   Date Value Ref Range Status   01/22/2024 49 40 - 150 unit/L Final     Aspartate Aminotransferase   Date Value Ref Range Status   01/22/2024 35 (H) 5 - 34 unit/L Final     Alanine Aminotransferase   Date Value Ref Range Status   01/22/2024 59 (H) 0 - 55 unit/L Final     eGFR   Date Value Ref Range Status   01/22/2024 >60 mls/min/1.73/m2 Final     Lab Results   Component Value Date    TSH 1.364 05/18/2023     HIV   Date Value Ref Range Status   01/22/2024 Nonreactive Nonreactive Final     PT   Date Value Ref Range Status   01/22/2024 13.0 11.4 - 14.0 seconds Final     INR   Date Value Ref Range Status   01/22/2024 1.0 <=1.3 Final     Syphilis Antibody   Date Value Ref Range Status   01/22/2024 Nonreactive Nonreactive, Equivocal Final     Hepatitis B Surface Antigen   Date Value Ref Range Status   11/29/2023 Reactive (A) Nonreactive Final     Hep C Ab Interp   Date Value Ref Range Status   11/29/2023 Nonreactive Nonreactive Final     Hepatitis B Surface Antigen Confirmation   Date Value Ref Range Status   11/29/2023 Confirmed Positive  Final     Results for orders placed or performed in visit on 01/22/24   Hepatitis B e Antigen   Result Value Ref Range    Hepatitis Be Ag, S Negative Negative     Results for  "orders placed or performed in visit on 01/22/24   Hepatitis B e antibody   Result Value Ref Range    HBe Antibody Positive (A) Negative     Results for orders placed or performed in visit on 01/22/24   Hepatitis B DNA, Quant   Result Value Ref Range    HBV DNA Detect/Quant <10 (A) Undetected IU/mL       Imaging: Reviewed most recent relevant imaging studies available, notable results highlighted in this note      Medications:     Current Outpatient Medications   Medication Instructions    cetirizine (ZYRTEC) 10 mg, Oral, Nightly    diclofenac (VOLTAREN) 50 mg, Oral, 3 times daily PRN    fluticasone propionate (FLONASE) 100 mcg, Each Nostril, Daily    norethindrone (MICRONOR) 0.35 mg tablet 1 tablet, Oral, Daily       Assessment:       Problem List Items Addressed This Visit    None  Visit Diagnoses       Chronic hepatitis B    -  Primary    Need for vaccination        Relevant Medications    Hepatitis A vaccine (HAVRIX) injection               Plan:      Chronic hepatitis B, hepatic steatosis  Diagnosed at birth, mother HBV + & on treatment.  Treatment naive, recent increase in LFTs due to steatosis.  Hep B e ag -, e ab +, Hep B c ab +, Hep B DNA <10.  1/22/24 AST 35, ALT 59, AFP 5.5  U/S 1/2024 consistent with steatosis.  Goal BMI <30.   Continue exercise activity to 30 minutes 3-5 times per week.  Avoid sugar sodas, simple sugars, sweets, large portions of rice, pasta, potatoes, or bread.   Choose whole grain/"brown" options when available, practice portion control.  Limit fast foods and fried foods.  Increase intake of fresh fruits and vegetables with lean meats (chicken, fish, etc.) daily.   Minimize alcohol intake, including beer and wine.  Consider permanent healthy lifestyle changes.  Minimize acetaminophen use.  Blood & sex precautions.   RTC 6 months with Chandni, Mon or Tues appt.     Need for vaccination  -     Hepatitis A vaccine (HAVRIX) injection  Hep A vaccine dose #1 today.                 "

## 2024-03-05 NOTE — PROGRESS NOTES
Hepatitis A Vaccination given IM left deltoid using aseptic tech. Patient tolerated well. To contact clinic prn.

## 2024-04-04 DIAGNOSIS — R09.81 COUGH WITH CONGESTION OF PARANASAL SINUS: ICD-10-CM

## 2024-04-04 DIAGNOSIS — R05.8 COUGH WITH CONGESTION OF PARANASAL SINUS: ICD-10-CM

## 2024-04-05 RX ORDER — CETIRIZINE HYDROCHLORIDE 10 MG/1
10 TABLET ORAL NIGHTLY
Qty: 30 TABLET | Refills: 3 | Status: SHIPPED | OUTPATIENT
Start: 2024-04-05

## 2024-08-05 DIAGNOSIS — R05.8 COUGH WITH CONGESTION OF PARANASAL SINUS: ICD-10-CM

## 2024-08-05 DIAGNOSIS — R09.81 COUGH WITH CONGESTION OF PARANASAL SINUS: ICD-10-CM

## 2024-08-06 RX ORDER — CETIRIZINE HYDROCHLORIDE 10 MG/1
10 TABLET ORAL NIGHTLY
Qty: 30 TABLET | Refills: 3 | Status: SHIPPED | OUTPATIENT
Start: 2024-08-06

## 2024-08-15 ENCOUNTER — OFFICE VISIT (OUTPATIENT)
Dept: FAMILY MEDICINE | Facility: CLINIC | Age: 39
End: 2024-08-15
Payer: MEDICAID

## 2024-08-15 VITALS
TEMPERATURE: 98 F | DIASTOLIC BLOOD PRESSURE: 84 MMHG | OXYGEN SATURATION: 100 % | HEART RATE: 71 BPM | BODY MASS INDEX: 33.84 KG/M2 | WEIGHT: 191 LBS | HEIGHT: 63 IN | SYSTOLIC BLOOD PRESSURE: 123 MMHG | RESPIRATION RATE: 18 BRPM

## 2024-08-15 DIAGNOSIS — F17.290 NICOTINE DEPENDENCE DUE TO VAPING TOBACCO PRODUCT: ICD-10-CM

## 2024-08-15 DIAGNOSIS — M25.522 LEFT ELBOW PAIN: ICD-10-CM

## 2024-08-15 DIAGNOSIS — Z20.2 ENCOUNTER FOR ASSESSMENT OF STD EXPOSURE: ICD-10-CM

## 2024-08-15 DIAGNOSIS — Z00.00 ANNUAL PHYSICAL EXAM: Primary | ICD-10-CM

## 2024-08-15 LAB
ALBUMIN SERPL-MCNC: 4.2 G/DL (ref 3.5–5)
ALBUMIN/GLOB SERPL: 1.1 RATIO (ref 1.1–2)
ALP SERPL-CCNC: 46 UNIT/L (ref 40–150)
ALT SERPL-CCNC: 23 UNIT/L (ref 0–55)
ANION GAP SERPL CALC-SCNC: 12 MEQ/L
AST SERPL-CCNC: 17 UNIT/L (ref 5–34)
BACTERIA #/AREA URNS AUTO: ABNORMAL /HPF
BASOPHILS # BLD AUTO: 0.03 X10(3)/MCL
BASOPHILS NFR BLD AUTO: 0.5 %
BILIRUB SERPL-MCNC: 0.8 MG/DL
BILIRUB UR QL STRIP.AUTO: NEGATIVE
BUN SERPL-MCNC: 7.6 MG/DL (ref 7–18.7)
C TRACH DNA SPEC QL NAA+PROBE: NOT DETECTED
CALCIUM SERPL-MCNC: 9.8 MG/DL (ref 8.4–10.2)
CHLORIDE SERPL-SCNC: 104 MMOL/L (ref 98–107)
CLARITY UR: CLEAR
CO2 SERPL-SCNC: 25 MMOL/L (ref 22–29)
COLOR UR AUTO: COLORLESS
CREAT SERPL-MCNC: 0.77 MG/DL (ref 0.55–1.02)
CREAT/UREA NIT SERPL: 10
EOSINOPHIL # BLD AUTO: 0.13 X10(3)/MCL (ref 0–0.9)
EOSINOPHIL NFR BLD AUTO: 2.2 %
ERYTHROCYTE [DISTWIDTH] IN BLOOD BY AUTOMATED COUNT: 12.1 % (ref 11.5–17)
GFR SERPLBLD CREATININE-BSD FMLA CKD-EPI: >60 ML/MIN/1.73/M2
GLOBULIN SER-MCNC: 3.7 GM/DL (ref 2.4–3.5)
GLUCOSE SERPL-MCNC: 70 MG/DL (ref 74–100)
GLUCOSE UR QL STRIP: NORMAL
HCT VFR BLD AUTO: 40.9 % (ref 37–47)
HCV AB SERPL QL IA: NONREACTIVE
HGB BLD-MCNC: 13.9 G/DL (ref 12–16)
HGB UR QL STRIP: ABNORMAL
HIV 1+2 AB+HIV1 P24 AG SERPL QL IA: NONREACTIVE
HYALINE CASTS #/AREA URNS LPF: ABNORMAL /LPF
IMM GRANULOCYTES # BLD AUTO: 0.02 X10(3)/MCL (ref 0–0.04)
IMM GRANULOCYTES NFR BLD AUTO: 0.3 %
KETONES UR QL STRIP: NEGATIVE
LEUKOCYTE ESTERASE UR QL STRIP: NEGATIVE
LYMPHOCYTES # BLD AUTO: 1.4 X10(3)/MCL (ref 0.6–4.6)
LYMPHOCYTES NFR BLD AUTO: 23.9 %
MCH RBC QN AUTO: 31.6 PG (ref 27–31)
MCHC RBC AUTO-ENTMCNC: 34 G/DL (ref 33–36)
MCV RBC AUTO: 93 FL (ref 80–94)
MONOCYTES # BLD AUTO: 0.33 X10(3)/MCL (ref 0.1–1.3)
MONOCYTES NFR BLD AUTO: 5.6 %
MUCOUS THREADS URNS QL MICRO: ABNORMAL /LPF
N GONORRHOEA DNA SPEC QL NAA+PROBE: NOT DETECTED
NEUTROPHILS # BLD AUTO: 3.95 X10(3)/MCL (ref 2.1–9.2)
NEUTROPHILS NFR BLD AUTO: 67.5 %
NITRITE UR QL STRIP: NEGATIVE
NRBC BLD AUTO-RTO: 0 %
PH UR STRIP: 6.5 [PH]
PLATELET # BLD AUTO: 279 X10(3)/MCL (ref 130–400)
PMV BLD AUTO: 10.1 FL (ref 7.4–10.4)
POTASSIUM SERPL-SCNC: 3.6 MMOL/L (ref 3.5–5.1)
PROT SERPL-MCNC: 7.9 GM/DL (ref 6.4–8.3)
PROT UR QL STRIP: NEGATIVE
RBC # BLD AUTO: 4.4 X10(6)/MCL (ref 4.2–5.4)
RBC #/AREA URNS AUTO: ABNORMAL /HPF
SODIUM SERPL-SCNC: 141 MMOL/L (ref 136–145)
SOURCE (OHS): NORMAL
SP GR UR STRIP.AUTO: <1.005 (ref 1–1.03)
SQUAMOUS #/AREA URNS LPF: ABNORMAL /HPF
T PALLIDUM AB SER QL: NONREACTIVE
TSH SERPL-ACNC: 1.21 UIU/ML (ref 0.35–4.94)
UROBILINOGEN UR STRIP-ACNC: NORMAL
WBC # BLD AUTO: 5.86 X10(3)/MCL (ref 4.5–11.5)
WBC #/AREA URNS AUTO: ABNORMAL /HPF

## 2024-08-15 PROCEDURE — 80053 COMPREHEN METABOLIC PANEL: CPT | Performed by: NURSE PRACTITIONER

## 2024-08-15 PROCEDURE — 86803 HEPATITIS C AB TEST: CPT | Performed by: NURSE PRACTITIONER

## 2024-08-15 PROCEDURE — 81001 URINALYSIS AUTO W/SCOPE: CPT | Performed by: NURSE PRACTITIONER

## 2024-08-15 PROCEDURE — 87661 TRICHOMONAS VAGINALIS AMPLIF: CPT | Performed by: NURSE PRACTITIONER

## 2024-08-15 PROCEDURE — 87591 N.GONORRHOEAE DNA AMP PROB: CPT | Performed by: NURSE PRACTITIONER

## 2024-08-15 PROCEDURE — 86780 TREPONEMA PALLIDUM: CPT | Performed by: NURSE PRACTITIONER

## 2024-08-15 PROCEDURE — 99214 OFFICE O/P EST MOD 30 MIN: CPT | Mod: PBBFAC | Performed by: NURSE PRACTITIONER

## 2024-08-15 PROCEDURE — 87491 CHLMYD TRACH DNA AMP PROBE: CPT | Performed by: NURSE PRACTITIONER

## 2024-08-15 PROCEDURE — 84443 ASSAY THYROID STIM HORMONE: CPT | Performed by: NURSE PRACTITIONER

## 2024-08-15 PROCEDURE — 87389 HIV-1 AG W/HIV-1&-2 AB AG IA: CPT | Performed by: NURSE PRACTITIONER

## 2024-08-15 PROCEDURE — 36415 COLL VENOUS BLD VENIPUNCTURE: CPT | Performed by: NURSE PRACTITIONER

## 2024-08-15 PROCEDURE — 85025 COMPLETE CBC W/AUTO DIFF WBC: CPT | Performed by: NURSE PRACTITIONER

## 2024-08-15 RX ORDER — METHYLPREDNISOLONE 4 MG/1
TABLET ORAL
Qty: 21 EACH | Refills: 0 | Status: SHIPPED | OUTPATIENT
Start: 2024-08-15 | End: 2024-09-05

## 2024-08-15 RX ORDER — DICLOFENAC SODIUM 50 MG/1
50 TABLET, DELAYED RELEASE ORAL 3 TIMES DAILY PRN
Qty: 30 TABLET | Refills: 1 | Status: SHIPPED | OUTPATIENT
Start: 2024-08-15

## 2024-08-15 NOTE — PROGRESS NOTES
Patient Name: Gretel Stein   : 1985  MRN: [id number]     SUBJECTIVE DATA:    CHIEF COMPLAINT:   Gretel Stein is a 39 y.o. female who presents to clinic today with Annual Exam and Elbow Pain (Left)        HPI:  39-year-old female presents to the clinic to complete her yearly wellness exam with labs and also would like to discuss left elbow pain.  At end of visit patient requesting STI screening.  Past medical history of chronic viral hepatitis B, asthma.    Left elbow pain:  Patient state left elbow pain started few months ago.  Patient state she started working out and utilizing weights.  Denies any fall or trauma.  Tender to lateral epicondyle.  Bilateral hand strength strong and equal.  Patient to read discharge education material on elbow tendinopathy, and exercises.  Discussed elbow brace, can be found at any pharmacy.  Rx Medrol Dosepak 4 mg p.o., follow directions on the label.  Rx diclofenac 50 mg p.o. up to 3 times a day as needed for pain, take with food and stay hydrated with water.  Do not take with any other NSAIDs such as Advil, Aleve, naproxen, Motrin, ibuprofen or aspirins.  Precaution with corticosteroids and birth control.  Utilize condom during treatment.  Questions solicited and answered, patient verbalized understanding and agreed to plan.  Return to clinic if no improvement.    STI screening:  Patient requesting screening.  Patient denies any symptoms.  Patient wanted to make sure everything is okay.  Discussed to practice safe sex.  No sex until or results becomes available.  Patient to read discharge education material.      Wellness:  Social Determinants of Health     Tobacco Use: High Risk (2024)    Patient History     Smoking Tobacco Use: Every Day     Smokeless Tobacco Use: Never     Passive Exposure: Not on file   Alcohol Use: Not At Risk (2023)    AUDIT-C     Frequency of Alcohol Consumption: Monthly or less     Average Number of Drinks: 3 or 4     Frequency of Binge Drinking:  Never   Financial Resource Strain: Low Risk  (5/18/2023)    Overall Financial Resource Strain (CARDIA)     Difficulty of Paying Living Expenses: Not hard at all   Food Insecurity: No Food Insecurity (5/18/2023)    Hunger Vital Sign     Worried About Running Out of Food in the Last Year: Never true     Ran Out of Food in the Last Year: Never true   Transportation Needs: No Transportation Needs (5/18/2023)    PRAPARE - Transportation     Lack of Transportation (Medical): No     Lack of Transportation (Non-Medical): No   Physical Activity: Sufficiently Active (5/18/2023)    Exercise Vital Sign     Days of Exercise per Week: 4 days     Minutes of Exercise per Session: 40 min   Stress: No Stress Concern Present (5/18/2023)    [employer] - Occupational Stress Questionnaire     Feeling of Stress : Not at all   Housing Stability: Low Risk  (5/18/2023)    Housing Stability Vital Sign     Unable to Pay for Housing in the Last Year: No     Number of Places Lived in the Last Year: 1     Unstable Housing in the Last Year: No   Depression: Low Risk  (3/5/2024)    Depression     Last PHQ-4: Flowsheet Data: 0   Utilities: Not At Risk (8/15/2024)    [facility] Utilities     Threatened with loss of utilities: No   Health Literacy: Adequate Health Literacy (8/15/2024)     Health Literacy     Frequency of need for help with medical instructions: Never   Social Isolation: Socially Integrated (8/15/2024)    Social Isolation     Social Isolation: 1     Last menstrual cycle: 08/12/2024 , regular  Car safety:  Seat belt used all the time.  Water:  Stay hydrated with fluids, drink 6-8 cups of water daily.  Tobacco use:  Vaping nicotine.  Declined smoke cessation at this time.  Patient to read discharge education material.  Alcohol:  Drink in moderation.  Exercise: exercise 30 minutes a day up to 5 days a week.  Stay active.  Lose weight.  Nutrition:  Follow low-cholesterol, low-fat, low-salt diet.  Eat fresh  "fruits and vegetables.  Keep in mind portion size.  Dental:  Patient does follow-up with a dentist yearly.  Ophthalmology:  Patient does follow-up with ophthalmologist yearly.  Cervical cancer screening exam:  HPV negative.  Patient to return in December 20, 2026 for screening.    Immunizations:  Up-to-date.  Lab work drawn today will notify of test results when they become available.         ALLERGIES: Review of patient's allergies indicates:  No Known Allergies      ROS:  Review of Systems   Musculoskeletal:  Positive for joint pain (Left elbow.).   All other systems reviewed and are negative.        OBJECTIVE DATA:  Vital signs  Vitals:    08/15/24 0755   BP: 123/84   Pulse: 71   Resp: 18   Temp: 97.9 °F (36.6 °C)   TempSrc: Oral   SpO2: 100%   Weight: 86.6 kg (191 lb)   Height: 5' 3" (1.6 m)      Body mass index is 33.83 kg/m².    PHYSICAL EXAM:   Physical Exam  Vitals and nursing note reviewed.   Constitutional:       General: She is awake.      Appearance: Normal appearance. She is well-developed and well-groomed. She is obese.   HENT:      Head: Normocephalic and atraumatic.      Right Ear: Tympanic membrane, ear canal and external ear normal.      Left Ear: Tympanic membrane, ear canal and external ear normal.      Nose: Nose normal.      Mouth/Throat:      Lips: Pink.      Mouth: Mucous membranes are moist.      Tongue: No lesions. Tongue does not deviate from midline.      Palate: No mass and lesions.      Pharynx: Oropharynx is clear. Uvula midline.   Eyes:      General: Lids are normal. Gaze aligned appropriately. No scleral icterus.     Extraocular Movements: Extraocular movements intact.      Conjunctiva/sclera: Conjunctivae normal.      Pupils: Pupils are equal, round, and reactive to light.      Visual Fields: Right eye visual fields normal and left eye visual fields normal.   Neck:      Thyroid: No thyroid mass, thyromegaly or thyroid tenderness.      Trachea: Trachea and phonation normal. "   Cardiovascular:      Rate and Rhythm: Normal rate and regular rhythm.      Pulses: Normal pulses.           Carotid pulses are 2+ on the right side and 2+ on the left side.       Radial pulses are 2+ on the right side and 2+ on the left side.        Femoral pulses are 2+ on the right side and 2+ on the left side.       Dorsalis pedis pulses are 2+ on the right side and 2+ on the left side.        Posterior tibial pulses are 2+ on the right side and 2+ on the left side.      Heart sounds: Normal heart sounds, S1 normal and S2 normal. No murmur heard.  Pulmonary:      Effort: Pulmonary effort is normal.      Breath sounds: Normal breath sounds and air entry. No wheezing or rhonchi.   Abdominal:      General: Bowel sounds are normal. There is no distension.      Palpations: Abdomen is soft. There is no mass.      Tenderness: There is no abdominal tenderness. There is no right CVA tenderness, left CVA tenderness, guarding or rebound.   Genitourinary:     Comments: Exam deferred.  Patient denies urinary genital issues.  Currently on her menstrual cycle.  Keep appointment with gyn as directed.  Musculoskeletal:         General: Normal range of motion.      Left elbow: No swelling, deformity, effusion or lacerations. Normal range of motion. Tenderness present in lateral epicondyle.        Arms:       Cervical back: Full passive range of motion without pain, normal range of motion and neck supple.      Right lower leg: No edema.      Left lower leg: No edema.   Lymphadenopathy:      Cervical: No cervical adenopathy.   Skin:     General: Skin is warm and dry.      Capillary Refill: Capillary refill takes less than 2 seconds.   Neurological:      General: No focal deficit present.      Mental Status: She is alert and oriented to person, place, and time. Mental status is at baseline.      GCS: GCS eye subscore is 4. GCS verbal subscore is 5. GCS motor subscore is 6.      Cranial Nerves: Cranial nerves 2-12 are intact. No  cranial nerve deficit.      Sensory: Sensation is intact. No sensory deficit.      Motor: Motor function is intact. No weakness.      Coordination: Coordination is intact. Coordination normal.      Gait: Gait is intact. Gait normal.   Psychiatric:         Attention and Perception: Attention and perception normal.         Mood and Affect: Mood and affect normal.         Speech: Speech normal.         Behavior: Behavior normal. Behavior is cooperative.         Thought Content: Thought content normal.         Cognition and Memory: Cognition and memory normal.         Judgment: Judgment normal.          ASSESSMENT/PLAN:  1. Annual physical exam  -     CBC Auto Differential  -     Comprehensive Metabolic Panel  -     TSH  -     Urinalysis, Reflex to Urine Culture    2. Left elbow pain  Assessment & Plan:   Patient state left elbow pain started few months ago.  Patient state she started working out and utilizing weights.  Denies any fall or trauma.  Tender to lateral epicondyle.  Bilateral hand strength strong and equal.  Patient to read discharge education material on elbow tendinopathy, and exercises.  Discussed elbow brace, can be found at any pharmacy.  Rx Medrol Dosepak 4 mg p.o., follow directions on the label.  Rx diclofenac 50 mg p.o. up to 3 times a day as needed for pain, take with food and stay hydrated with water.  Do not take with any other NSAIDs such as Advil, Aleve, naproxen, Motrin, ibuprofen or aspirins.  Precaution with corticosteroids and birth control.  Utilize condom during treatment.  Questions solicited and answered, patient verbalized understanding and agreed to plan.  Return to clinic if no improvement.    Orders:  -     diclofenac (VOLTAREN) 50 MG EC tablet; Take 1 tablet (50 mg total) by mouth 3 (three) times daily as needed (pain).  Dispense: 30 tablet; Refill: 1  -     methylPREDNISolone (MEDROL DOSEPACK) 4 mg tablet; use as directed  Dispense: 21 each; Refill: 0    3. Nicotine dependence due  to vaping tobacco product  Assessment & Plan:  Smoker, vaping with nicotine. assistance with smoking cessation was offered including: counseling and printed information on smoking cessation. The patient was counseled regarding smoking and smoking cessation for 3-10 minutes.   Advise patient to stop smoking since she has history of asthma.  Discussed progression of asthma to COPD and then and results is emphysema.  Patient not ready to quit.  Will notify the clinic when ready.      4. Encounter for assessment of STD exposure  Assessment & Plan:  Patient requesting screening.  Patient denies any symptoms.  Patient wanted to make sure everything is okay.  Discussed to practice safe sex.  No sex until or results becomes available.  Patient to read discharge education material.    Orders:  -     HIV 1/2 Ag/Ab (4th Gen)  -     Hepatitis C Antibody  -     SYPHILIS ANTIBODY (WITH REFLEX RPR)  -     Chlamydia/GC, PCR  -     Trichomonas vaginalis Amplified RNA           RESULTS:  Recent Results (from the past 1008 hour(s))   Comprehensive Metabolic Panel    Collection Time: 08/15/24  8:33 AM   Result Value Ref Range    Sodium 141 136 - 145 mmol/L    Potassium 3.6 3.5 - 5.1 mmol/L    Chloride 104 98 - 107 mmol/L    CO2 25 22 - 29 mmol/L    Glucose 70 (L) 74 - 100 mg/dL    Blood Urea Nitrogen 7.6 7.0 - 18.7 mg/dL    Creatinine 0.77 0.55 - 1.02 mg/dL    Calcium 9.8 8.4 - 10.2 mg/dL    Protein Total 7.9 6.4 - 8.3 gm/dL    Albumin 4.2 3.5 - 5.0 g/dL    Globulin 3.7 (H) 2.4 - 3.5 gm/dL    Albumin/Globulin Ratio 1.1 1.1 - 2.0 ratio    Bilirubin Total 0.8 <=1.5 mg/dL    ALP 46 40 - 150 unit/L    ALT 23 0 - 55 unit/L    AST 17 5 - 34 unit/L    eGFR >60 mL/min/1.73/m2    Anion Gap 12.0 mEq/L    BUN/Creatinine Ratio 10    TSH    Collection Time: 08/15/24  8:33 AM   Result Value Ref Range    TSH 1.209 0.350 - 4.940 uIU/mL   Urinalysis, Reflex to Urine Culture    Collection Time: 08/15/24  8:33 AM    Specimen: Urine   Result Value Ref  Range    Color, UA Colorless (A) Yellow, Light-Yellow, Dark Yellow, Marcie, Straw    Appearance, UA Clear Clear    Specific Gravity, UA <1.005 (L) 1.005 - 1.030    pH, UA 6.5 5.0 - 8.5    Protein, UA Negative Negative    Glucose, UA Normal Negative, Normal    Ketones, UA Negative Negative    Blood, UA 2+ (A) Negative    Bilirubin, UA Negative Negative    Urobilinogen, UA Normal 0.2, 1.0, Normal    Nitrites, UA Negative Negative    Leukocyte Esterase, UA Negative Negative    RBC, UA 0-5 None Seen, 0-2, 3-5, 0-5 /HPF    WBC, UA 0-5 None Seen, 0-2, 3-5, 0-5 /HPF    Bacteria, UA None Seen None Seen /HPF    Squamous Epithelial Cells, UA None Seen None Seen /HPF    Mucous, UA Trace (A) None Seen /LPF    Hyaline Casts, UA None Seen None Seen /lpf   CBC with Differential    Collection Time: 08/15/24  8:33 AM   Result Value Ref Range    WBC 5.86 4.50 - 11.50 x10(3)/mcL    RBC 4.40 4.20 - 5.40 x10(6)/mcL    Hgb 13.9 12.0 - 16.0 g/dL    Hct 40.9 37.0 - 47.0 %    MCV 93.0 80.0 - 94.0 fL    MCH 31.6 (H) 27.0 - 31.0 pg    MCHC 34.0 33.0 - 36.0 g/dL    RDW 12.1 11.5 - 17.0 %    Platelet 279 130 - 400 x10(3)/mcL    MPV 10.1 7.4 - 10.4 fL    Neut % 67.5 %    Lymph % 23.9 %    Mono % 5.6 %    Eos % 2.2 %    Basophil % 0.5 %    Lymph # 1.40 0.6 - 4.6 x10(3)/mcL    Neut # 3.95 2.1 - 9.2 x10(3)/mcL    Mono # 0.33 0.1 - 1.3 x10(3)/mcL    Eos # 0.13 0 - 0.9 x10(3)/mcL    Baso # 0.03 <=0.2 x10(3)/mcL    IG# 0.02 0 - 0.04 x10(3)/mcL    IG% 0.3 %    NRBC% 0.0 %   HIV 1/2 Ag/Ab (4th Gen)    Collection Time: 08/15/24  8:33 AM   Result Value Ref Range    HIV Nonreactive Nonreactive   Hepatitis C Antibody    Collection Time: 08/15/24  8:33 AM   Result Value Ref Range    Hep C Ab Interp Nonreactive Nonreactive   SYPHILIS ANTIBODY (WITH REFLEX RPR)    Collection Time: 08/15/24  8:33 AM   Result Value Ref Range    Syphilis Antibody Nonreactive Nonreactive, Equivocal   Chlamydia/GC, PCR    Collection Time: 08/15/24  8:33 AM    Specimen: Urine, Clean  Catch   Result Value Ref Range    Chlamydia trachomatis PCR Not Detected Not Detected    N. gonorrhea PCR Not Detected Not Detected    Source Urine, Clean Catch          Follow Up:  Follow up in about 1 year (around 8/18/2025).     Face to face time 45 minutes, including documentation, chart review, counseling, education, review of test results, relevant medical records, and coordination of care.   I have explained the treatment plan, diagnosis, and prognosis to patient. All questions are answered to the best of my knowledge       Previous medical history/lab work/radiology reviewed and considered during medical management decisions.   Medication list reviewed and medication reconciliation performed.  Patient was provided  and care about his/her current diagnosis (es) and medications including risk/benefit and side effects/adverse events, over the counter medication uses/doses, home self-care and contact precautions,  and red flags and indications for when to seek immediate medical attention.   Patient was advised to continue compliance with current medication list and medical recommendations.  Patient dvised continued compliance with recommended eating habits/ diets for medical conditions and exercise 150 minutes/ week (if possible) for medical condition (s).  Educational handouts and instructions on selected disease management in AVS (After Visit Summary).    All of the patient's questions were answered to patient's satisfaction.   The patient was receptive, expressed verbal understanding and agreement the above plan.      This note was created with the assistance of a voice recognition software or phone dictation. There may be transcription errors as a result of using this technology however minimal. Effort has been made to assure accuracy of transcription but any obvious errors or omissions should be clarified with the author of the document

## 2024-08-15 NOTE — ASSESSMENT & PLAN NOTE
Patient state left elbow pain started few months ago.  Patient state she started working out and utilizing weights.  Denies any fall or trauma.  Tender to lateral epicondyle.  Bilateral hand strength strong and equal.  Patient to read discharge education material on elbow tendinopathy, and exercises.  Discussed elbow brace, can be found at any pharmacy.  Rx Medrol Dosepak 4 mg p.o., follow directions on the label.  Rx diclofenac 50 mg p.o. up to 3 times a day as needed for pain, take with food and stay hydrated with water.  Do not take with any other NSAIDs such as Advil, Aleve, naproxen, Motrin, ibuprofen or aspirins.  Precaution with corticosteroids and birth control.  Utilize condom during treatment.  Questions solicited and answered, patient verbalized understanding and agreed to plan.  Return to clinic if no improvement.

## 2024-08-15 NOTE — PATIENT INSTRUCTIONS
Gene Majano,     If you are due for any health screening(s) below please notify me so we can arrange them to be ordered and scheduled. Most healthy patients at your age complete them, but you are free to accept or refuse.     If you can't do it, I'll definitely understand. If you can, I'd certainly appreciate it!    All of your core healthy metrics are met.      Were here to help you quit smoking     Our records indicated that you are still smoking. One of the best things you can do for your health is to stop smoking and we are here to help.     Talk with your provider about our Smoking Cessation Program and how we can support you on your journey.

## 2024-08-15 NOTE — ASSESSMENT & PLAN NOTE
Patient requesting screening.  Patient denies any symptoms.  Patient wanted to make sure everything is okay.  Discussed to practice safe sex.  No sex until or results becomes available.  Patient to read discharge education material.

## 2024-08-16 ENCOUNTER — TELEPHONE (OUTPATIENT)
Dept: FAMILY MEDICINE | Facility: CLINIC | Age: 39
End: 2024-08-16
Payer: MEDICAID

## 2024-08-16 PROBLEM — F17.290 NICOTINE DEPENDENCE DUE TO VAPING TOBACCO PRODUCT: Status: ACTIVE | Noted: 2024-08-16

## 2024-08-16 LAB
SPECIMEN SOURCE: NORMAL
T VAGINALIS RRNA SPEC QL NAA+PROBE: NEGATIVE

## 2024-08-16 NOTE — TELEPHONE ENCOUNTER
Called patient to give results. Patient verbalized understanding. No additional questions at this time.       ----- Message from JOSE Sweet sent at 8/16/2024  8:19 AM CDT -----  PLEASE CALL PATIENTS WITH RESULTS,  Trichomoniasis pending will notify patient of test results when it becomes available.  Chemistry, electrolytes kidney liver functions within normal range.  Urinalysis no sign of infection, stay hydrated with water.  Blood count no sign of anemia or infection.  Chlamydia, gonorrhea, HIV, syphilis, hep C all negative.   Thyroid function test within normal range.  Keep up the good work.  Return to clinic as needed.  Keep your follow-up appointment.

## 2024-08-16 NOTE — PROGRESS NOTES
PLEASE CALL PATIENTS WITH RESULTS,  Trichomoniasis pending will notify patient of test results when it becomes available.  Chemistry, electrolytes kidney liver functions within normal range.  Urinalysis no sign of infection, stay hydrated with water.  Blood count no sign of anemia or infection.  Chlamydia, gonorrhea, HIV, syphilis, hep C all negative.   Thyroid function test within normal range.  Keep up the good work.  Return to clinic as needed.  Keep your follow-up appointment.

## 2024-08-16 NOTE — ASSESSMENT & PLAN NOTE
Smoker, vaping with nicotine. assistance with smoking cessation was offered including: counseling and printed information on smoking cessation. The patient was counseled regarding smoking and smoking cessation for 3-10 minutes.   Advise patient to stop smoking since she has history of asthma.  Discussed progression of asthma to COPD and then and results is emphysema.  Patient not ready to quit.  Will notify the clinic when ready.

## 2024-08-21 ENCOUNTER — TELEPHONE (OUTPATIENT)
Dept: FAMILY MEDICINE | Facility: CLINIC | Age: 39
End: 2024-08-21
Payer: MEDICAID

## 2024-08-21 NOTE — TELEPHONE ENCOUNTER
Called patient to give results. Patient verbalized understanding. No additional questions at this time.       ----- Message from JOSE Sweet sent at 8/19/2024 12:53 PM CDT -----  PLEASE CALL PATIENTS WITH RESULT  Trichomoniasis negative.

## 2024-09-23 ENCOUNTER — LAB VISIT (OUTPATIENT)
Dept: LAB | Facility: HOSPITAL | Age: 39
End: 2024-09-23
Attending: NURSE PRACTITIONER
Payer: MEDICAID

## 2024-09-23 ENCOUNTER — OFFICE VISIT (OUTPATIENT)
Dept: INFECTIOUS DISEASES | Facility: CLINIC | Age: 39
End: 2024-09-23
Payer: MEDICAID

## 2024-09-23 VITALS
DIASTOLIC BLOOD PRESSURE: 86 MMHG | TEMPERATURE: 99 F | RESPIRATION RATE: 12 BRPM | WEIGHT: 196 LBS | BODY MASS INDEX: 34.73 KG/M2 | HEART RATE: 74 BPM | HEIGHT: 63 IN | SYSTOLIC BLOOD PRESSURE: 128 MMHG

## 2024-09-23 DIAGNOSIS — Z23 NEED FOR VACCINATION: ICD-10-CM

## 2024-09-23 DIAGNOSIS — B18.1 CHRONIC HEPATITIS B: ICD-10-CM

## 2024-09-23 DIAGNOSIS — B18.1 CHRONIC HEPATITIS B: Primary | ICD-10-CM

## 2024-09-23 PROCEDURE — 90472 IMMUNIZATION ADMIN EACH ADD: CPT | Mod: PBBFAC

## 2024-09-23 PROCEDURE — 1159F MED LIST DOCD IN RCRD: CPT | Mod: CPTII,,, | Performed by: NURSE PRACTITIONER

## 2024-09-23 PROCEDURE — 3079F DIAST BP 80-89 MM HG: CPT | Mod: CPTII,,, | Performed by: NURSE PRACTITIONER

## 2024-09-23 PROCEDURE — 1160F RVW MEDS BY RX/DR IN RCRD: CPT | Mod: CPTII,,, | Performed by: NURSE PRACTITIONER

## 2024-09-23 PROCEDURE — 3008F BODY MASS INDEX DOCD: CPT | Mod: CPTII,,, | Performed by: NURSE PRACTITIONER

## 2024-09-23 PROCEDURE — 87517 HEPATITIS B DNA QUANT: CPT

## 2024-09-23 PROCEDURE — 90471 IMMUNIZATION ADMIN: CPT | Mod: PBBFAC

## 2024-09-23 PROCEDURE — 90656 IIV3 VACC NO PRSV 0.5 ML IM: CPT | Mod: PBBFAC

## 2024-09-23 PROCEDURE — 99214 OFFICE O/P EST MOD 30 MIN: CPT | Mod: S$PBB,,, | Performed by: NURSE PRACTITIONER

## 2024-09-23 PROCEDURE — 90632 HEPA VACCINE ADULT IM: CPT | Mod: PBBFAC

## 2024-09-23 PROCEDURE — 36415 COLL VENOUS BLD VENIPUNCTURE: CPT

## 2024-09-23 PROCEDURE — 99214 OFFICE O/P EST MOD 30 MIN: CPT | Mod: PBBFAC | Performed by: NURSE PRACTITIONER

## 2024-09-23 PROCEDURE — 3074F SYST BP LT 130 MM HG: CPT | Mod: CPTII,,, | Performed by: NURSE PRACTITIONER

## 2024-09-23 RX ADMIN — INFLUENZA VIRUS VACCINE 0.5 ML: 15; 15; 15 SUSPENSION INTRAMUSCULAR at 08:09

## 2024-09-23 RX ADMIN — HEPATITIS A VACCINE 1440 UNITS: 1440 INJECTION, SUSPENSION INTRAMUSCULAR at 08:09

## 2024-09-23 NOTE — PROGRESS NOTES
Patient ID: Gretel Stein 39 y.o.     Chief Complaint:   Chief Complaint   Patient presents with    Followup Hep B     Denies problems        HPI:    9/23/24  Gretel is a 40 yo AF here today for HBV f/u visit. She is treatment naive and denies jaundice, icterus, nausea, vomiting, dark urine, or omid colored stools.  Labs 1/24 HBV <10, 8/24 AST decreased to 17, ALT decreased to 23. She is still trying to watch diet somewhat, encouraged healthy eating plan for liver & overall health. Last RUQ abd u/s 1/24 with hepatomegaly & steatosis. Will repeat scan. She tolerated 1st dose of Hep A vaccine well, amenable to 2nd dose today with flu vaccine. She has no concerns or questions today. Appreciates virtual visit, as she does have a 3rd son at home and would be more convenient. Will schedule as requested.    3/5/24  Gretel is a 39 yo  Female presenting today for HBV f/u.  Labs collected 1/22/24. Hep B VL <10, AST 35, ALT 59, Creatinine 0.68, AFP 5.50. She is not immune to Hep A, negative for Hep C, HIV & syphilis. RUQ abdominal u/s consistent with steatosis. She has already adjusted diet, cut back on alcohol, started exercise routine. Encouraged to continue same. She is amenable to Hep A vaccination, will give 1st dose today. All questions answered & concerns addressed.     1/22/24  Gretel is a 39 yo  Female here today for HBV evaluation, referred by PCP MEMO Avery NP.  She tells me that she was initially diagnosed at birth, mother with Hep B. She is treatment naive, but most recently had an increase in LFTs over past 6 months or so. She denies jaundice, icterus, nausea, vomiting, dark urine, or omid colored stools.  She generally has only a couple of drinks maybe twice a month, but tells me that she did have some increased stressors over the past 6 months or so and was going out with friends more often than usual and drinking more alcohol. Last drink 2 days ago, 2 beer. Rarely takes Tylenol. On OCP since 2021. No new  sexual partners. Has not had RUQ abd u/s done, will order. Amenable to flu vaccine today. All questions answered & concerns addressed.           Past Medical History:   Diagnosis Date    Chronic hepatitis B     Since birth according to patient.    Chronic hepatitis B affecting antepartum care of mother 08/02/2021    Mild intermittent asthma, uncomplicated     Nicotine dependence due to vaping tobacco product 08/16/2024        Past Surgical History:   Procedure Laterality Date    ADENOIDECTOMY      HERNIA REPAIR      TONSILLECTOMY          Social History     Socioeconomic History    Marital status: Single    Number of children: 2    Highest education level: Bachelor's degree (e.g., BA, AB, BS)   Occupational History    Occupation: resturant   Tobacco Use    Smoking status: Every Day     Types: Vaping with nicotine    Smokeless tobacco: Never   Substance and Sexual Activity    Alcohol use: Yes     Comment: Socially    Drug use: Never    Sexual activity: Yes     Partners: Male     Birth control/protection: Condom, Pill     Social Determinants of Health     Financial Resource Strain: Low Risk  (5/18/2023)    Overall Financial Resource Strain (CARDIA)     Difficulty of Paying Living Expenses: Not hard at all   Food Insecurity: No Food Insecurity (5/18/2023)    Hunger Vital Sign     Worried About Running Out of Food in the Last Year: Never true     Ran Out of Food in the Last Year: Never true   Transportation Needs: No Transportation Needs (5/18/2023)    PRAPARE - Transportation     Lack of Transportation (Medical): No     Lack of Transportation (Non-Medical): No   Physical Activity: Sufficiently Active (5/18/2023)    Exercise Vital Sign     Days of Exercise per Week: 4 days     Minutes of Exercise per Session: 40 min   Stress: No Stress Concern Present (5/18/2023)    Citizen of [place] [employer] Occupational [employer] - Occupational Stress Questionnaire     Feeling of Stress : Not at all   Housing Stability: Low Risk  (5/18/2023)  "   Housing Stability Vital Sign     Unable to Pay for Housing in the Last Year: No     Number of Places Lived in the Last Year: 1     Unstable Housing in the Last Year: No        Family History   Problem Relation Name Age of Onset    Hepatitis Mother          B    Hepatitis Father          B        Review of patient's allergies indicates:  No Known Allergies     Immunization History   Administered Date(s) Administered    COVID-19, MRNA, LN-S, PF (Pfizer) (Purple Cap) 08/15/2021, 09/05/2021    Hepatitis A, Adult 03/05/2024, 09/23/2024    Influenza - Quadrivalent - PF *Preferred* (6 months and older) 10/08/2019, 09/16/2021, 01/22/2024    Influenza - Trivalent - Fluarix, Flulaval, Fluzone, Afluria - PF 09/23/2024    Influenza A (H1N1) 2009 Monovalent - Intranasal 11/18/2009    Meningococcal Conjugate (MCV4P) 02/15/2006    Pneumococcal Conjugate - 20 Valent 05/18/2023    Tdap 10/08/2019        Review of Systems   Constitutional: Negative.    HENT: Negative.     Eyes: Negative.    Respiratory: Negative.     Cardiovascular: Negative.    Gastrointestinal: Negative.    Genitourinary: Negative.    Musculoskeletal: Negative.    Skin: Negative.    Neurological: Negative.    Endo/Heme/Allergies: Negative.    Psychiatric/Behavioral: Negative.     All other systems reviewed and are negative.         Objective:      /86 (BP Location: Right arm, Patient Position: Sitting, BP Method: Large (Automatic))   Pulse 74   Temp 98.5 °F (36.9 °C) (Oral)   Resp 12   Ht 5' 3" (1.6 m)   Wt 88.9 kg (195 lb 15.8 oz)   BMI 34.72 kg/m²      Physical Exam  Vitals reviewed.   Constitutional:       General: She is not in acute distress.     Appearance: Normal appearance. She is not toxic-appearing.   Eyes:      General: No scleral icterus.  Cardiovascular:      Rate and Rhythm: Normal rate and regular rhythm.      Heart sounds: Normal heart sounds.   Pulmonary:      Effort: Pulmonary effort is normal. No respiratory distress.      Breath " sounds: Normal breath sounds.   Abdominal:      General: Bowel sounds are normal. There is no distension.      Palpations: Abdomen is soft. There is no mass.      Tenderness: There is no abdominal tenderness.   Musculoskeletal:         General: Normal range of motion.   Skin:     General: Skin is warm and dry.   Neurological:      Mental Status: She is alert and oriented to person, place, and time.          Labs:   Lab Results   Component Value Date    WBC 5.86 08/15/2024    HGB 13.9 08/15/2024    HCT 40.9 08/15/2024    MCV 93.0 08/15/2024     08/15/2024       CMP  Sodium   Date Value Ref Range Status   08/15/2024 141 136 - 145 mmol/L Final     Potassium   Date Value Ref Range Status   08/15/2024 3.6 3.5 - 5.1 mmol/L Final     Chloride   Date Value Ref Range Status   08/15/2024 104 98 - 107 mmol/L Final     CO2   Date Value Ref Range Status   08/15/2024 25 22 - 29 mmol/L Final     Blood Urea Nitrogen   Date Value Ref Range Status   08/15/2024 7.6 7.0 - 18.7 mg/dL Final     Creatinine   Date Value Ref Range Status   08/15/2024 0.77 0.55 - 1.02 mg/dL Final     Calcium   Date Value Ref Range Status   08/15/2024 9.8 8.4 - 10.2 mg/dL Final     Albumin   Date Value Ref Range Status   08/15/2024 4.2 3.5 - 5.0 g/dL Final     Bilirubin Total   Date Value Ref Range Status   08/15/2024 0.8 <=1.5 mg/dL Final     ALP   Date Value Ref Range Status   08/15/2024 46 40 - 150 unit/L Final     AST   Date Value Ref Range Status   08/15/2024 17 5 - 34 unit/L Final     ALT   Date Value Ref Range Status   08/15/2024 23 0 - 55 unit/L Final     eGFR   Date Value Ref Range Status   08/15/2024 >60 mL/min/1.73/m2 Final     Lab Results   Component Value Date    TSH 1.209 08/15/2024     HIV   Date Value Ref Range Status   08/15/2024 Nonreactive Nonreactive Final     INR   Date Value Ref Range Status   01/22/2024 1.0 <=1.3 Final     Syphilis Antibody   Date Value Ref Range Status   08/15/2024 Nonreactive Nonreactive, Equivocal Final      Hep C Ab Interp   Date Value Ref Range Status   08/15/2024 Nonreactive Nonreactive Final     Hep BsAg Conf   Date Value Ref Range Status   11/29/2023 Confirmed Positive  Final     Results for orders placed or performed in visit on 01/22/24   Hepatitis B e Antigen   Result Value Ref Range    Hepatitis Be Ag, S Negative Negative     Results for orders placed or performed in visit on 01/22/24   Hepatitis B e antibody   Result Value Ref Range    HBe Antibody Positive (A) Negative     Results for orders placed or performed in visit on 01/22/24   Hepatitis B DNA, Quant   Result Value Ref Range    HBV DNA Detect/Quant <10 (A) Undetected IU/mL       Imaging: Reviewed most recent relevant imaging studies available, notable results highlighted in this note      Medications:     Current Outpatient Medications   Medication Instructions    cetirizine (ZYRTEC) 10 mg, Oral, Nightly    diclofenac (VOLTAREN) 50 mg, Oral, 3 times daily PRN    fluticasone propionate (FLONASE) 100 mcg, Each Nostril, Daily    norethindrone (MICRONOR) 0.35 mg tablet 1 tablet, Oral, Daily       Assessment:       Problem List Items Addressed This Visit    None  Visit Diagnoses       Chronic hepatitis B    -  Primary    Relevant Orders    HEPATITIS B VIRAL DNA, QUANTITATIVE    US Abdomen Limited    Need for vaccination        Relevant Medications    Hep A (Havrix) IM vaccine (>/= 18 yo) (Completed)    influenza (Flulaval, Fluzone, Fluarix) 45 mcg/0.5 mL IM vaccine (> or = 6 mo) 0.5 mL (Completed)               Plan:      Chronic hepatitis B  -     HEPATITIS B VIRAL DNA, QUANTITATIVE; Future; Expected date: 09/23/2024  -     US Abdomen Limited; Future; Expected date: 10/07/2024  Diagnosed at birth, mother HBV + & on treatment.  Treatment naive, necroinflammation resolved.  Hep B e ag -, e ab +, Hep B c ab +, Hep B DNA <10.  1/22/24 AST 35, ALT 59, AFP 5.5  8/15/24 AST 17, ALT 23.   U/S 1/2024 consistent with steatosis.  Goal BMI <30.   Continue exercise  "activity to 30 minutes 3-5 times per week.  Avoid sugar sodas, simple sugars, sweets, large portions of rice, pasta, potatoes, or bread.   Choose whole grain/"brown" options when available, practice portion control.  Limit fast foods and fried foods.  Increase intake of fresh fruits and vegetables with lean meats (chicken, fish, etc.) daily.   Minimize alcohol intake, including beer and wine.  Consider permanent healthy lifestyle changes.  Minimize acetaminophen use.  Blood & sex precautions.   RTC 6 months with yobany Tariq.    Need for vaccination  -     Hep A (Havrix) IM vaccine (>/= 18 yo)  -     influenza (Flulaval, Fluzone, Fluarix) 45 mcg/0.5 mL IM vaccine (> or = 6 mo) 0.5 mL  Hep A #2 & Flu vaccines today.                  "

## 2024-09-24 ENCOUNTER — TELEPHONE (OUTPATIENT)
Dept: INFECTIOUS DISEASES | Facility: CLINIC | Age: 39
End: 2024-09-24
Payer: MEDICAID

## 2024-09-24 ENCOUNTER — PATIENT MESSAGE (OUTPATIENT)
Dept: INFECTIOUS DISEASES | Facility: CLINIC | Age: 39
End: 2024-09-24
Payer: MEDICAID

## 2024-09-24 DIAGNOSIS — B18.1 CHRONIC HEPATITIS B: Primary | ICD-10-CM

## 2024-09-24 LAB
HBV DNA SERPL NAA+PROBE-ACNC: DETECTED [IU]/ML
HBV QUANTITATIVE RESULT (OHS): 5625 IU/ML

## 2024-09-24 NOTE — TELEPHONE ENCOUNTER
Lab results reviewed.  HBV DNA has increased from <10 to 5625 copies. However, liver enzymes have decreased with AST 17, ALT 23. Phoned pt with results. She denies jaundice, icterus, nausea, vomiting, dark urine, or omid colored stools.  She is not on any immunosuppressive treatment or steroids. No recent illness. Will repeat labs in 3 months & sent link to patient to self schedule a virtual visit in 3 months as well. She voiced understanding & appreciation.

## 2024-10-17 ENCOUNTER — HOSPITAL ENCOUNTER (OUTPATIENT)
Dept: RADIOLOGY | Facility: HOSPITAL | Age: 39
Discharge: HOME | End: 2024-10-17
Attending: NURSE PRACTITIONER
Payer: MEDICAID

## 2024-10-17 DIAGNOSIS — B18.1 CHRONIC HEPATITIS B: ICD-10-CM

## 2024-10-17 PROCEDURE — 76705 ECHO EXAM OF ABDOMEN: CPT | Mod: TC

## 2024-10-22 ENCOUNTER — PATIENT MESSAGE (OUTPATIENT)
Dept: RESEARCH | Facility: HOSPITAL | Age: 39
End: 2024-10-22
Payer: MEDICAID

## 2024-10-31 ENCOUNTER — E-VISIT (OUTPATIENT)
Dept: FAMILY MEDICINE | Facility: CLINIC | Age: 39
End: 2024-10-31
Payer: MEDICAID

## 2024-10-31 DIAGNOSIS — U07.1 COVID-19: Primary | ICD-10-CM

## 2024-10-31 RX ORDER — BENZONATATE 200 MG/1
200 CAPSULE ORAL 3 TIMES DAILY PRN
Qty: 30 CAPSULE | Refills: 0 | Status: SHIPPED | OUTPATIENT
Start: 2024-10-31 | End: 2024-11-10

## 2024-10-31 RX ORDER — FLUTICASONE PROPIONATE 50 MCG
2 SPRAY, SUSPENSION (ML) NASAL DAILY
Qty: 16 G | Refills: 1 | Status: SHIPPED | OUTPATIENT
Start: 2024-10-31

## 2024-11-26 ENCOUNTER — LAB VISIT (OUTPATIENT)
Dept: LAB | Facility: HOSPITAL | Age: 39
End: 2024-11-26
Attending: NURSE PRACTITIONER
Payer: MEDICAID

## 2024-11-26 DIAGNOSIS — B18.1 CHRONIC HEPATITIS B: ICD-10-CM

## 2024-11-26 LAB
ALBUMIN SERPL-MCNC: 4.2 G/DL (ref 3.5–5)
ALBUMIN/GLOB SERPL: 1.2 RATIO (ref 1.1–2)
ALP SERPL-CCNC: 38 UNIT/L (ref 40–150)
ALT SERPL-CCNC: 26 UNIT/L (ref 0–55)
ANION GAP SERPL CALC-SCNC: 8 MEQ/L
AST SERPL-CCNC: 22 UNIT/L (ref 5–34)
BASOPHILS # BLD AUTO: 0.02 X10(3)/MCL
BASOPHILS NFR BLD AUTO: 0.4 %
BILIRUB SERPL-MCNC: 0.8 MG/DL
BUN SERPL-MCNC: 7.6 MG/DL (ref 7–18.7)
CALCIUM SERPL-MCNC: 9.3 MG/DL (ref 8.4–10.2)
CHLORIDE SERPL-SCNC: 106 MMOL/L (ref 98–107)
CO2 SERPL-SCNC: 26 MMOL/L (ref 22–29)
CREAT SERPL-MCNC: 0.72 MG/DL (ref 0.55–1.02)
CREAT/UREA NIT SERPL: 11
EOSINOPHIL # BLD AUTO: 0.22 X10(3)/MCL (ref 0–0.9)
EOSINOPHIL NFR BLD AUTO: 4.2 %
ERYTHROCYTE [DISTWIDTH] IN BLOOD BY AUTOMATED COUNT: 11.4 % (ref 11.5–17)
GFR SERPLBLD CREATININE-BSD FMLA CKD-EPI: >60 ML/MIN/1.73/M2
GLOBULIN SER-MCNC: 3.4 GM/DL (ref 2.4–3.5)
GLUCOSE SERPL-MCNC: 94 MG/DL (ref 74–100)
HCT VFR BLD AUTO: 39.4 % (ref 37–47)
HGB BLD-MCNC: 14 G/DL (ref 12–16)
IMM GRANULOCYTES # BLD AUTO: 0.02 X10(3)/MCL (ref 0–0.04)
IMM GRANULOCYTES NFR BLD AUTO: 0.4 %
INR PPP: 1
LYMPHOCYTES # BLD AUTO: 2.08 X10(3)/MCL (ref 0.6–4.6)
LYMPHOCYTES NFR BLD AUTO: 39.6 %
MCH RBC QN AUTO: 32 PG (ref 27–31)
MCHC RBC AUTO-ENTMCNC: 35.5 G/DL (ref 33–36)
MCV RBC AUTO: 90 FL (ref 80–94)
MONOCYTES # BLD AUTO: 0.37 X10(3)/MCL (ref 0.1–1.3)
MONOCYTES NFR BLD AUTO: 7 %
NEUTROPHILS # BLD AUTO: 2.54 X10(3)/MCL (ref 2.1–9.2)
NEUTROPHILS NFR BLD AUTO: 48.4 %
NRBC BLD AUTO-RTO: 0 %
PLATELET # BLD AUTO: 253 X10(3)/MCL (ref 130–400)
PMV BLD AUTO: 8.9 FL (ref 7.4–10.4)
POTASSIUM SERPL-SCNC: 3.7 MMOL/L (ref 3.5–5.1)
PROT SERPL-MCNC: 7.6 GM/DL (ref 6.4–8.3)
PROTHROMBIN TIME: 13.2 SECONDS (ref 11.4–14)
RBC # BLD AUTO: 4.38 X10(6)/MCL (ref 4.2–5.4)
SODIUM SERPL-SCNC: 140 MMOL/L (ref 136–145)
WBC # BLD AUTO: 5.25 X10(3)/MCL (ref 4.5–11.5)

## 2024-11-26 PROCEDURE — 87517 HEPATITIS B DNA QUANT: CPT

## 2024-11-26 PROCEDURE — 36415 COLL VENOUS BLD VENIPUNCTURE: CPT

## 2024-11-26 PROCEDURE — 80053 COMPREHEN METABOLIC PANEL: CPT

## 2024-11-26 PROCEDURE — 85610 PROTHROMBIN TIME: CPT

## 2024-11-26 PROCEDURE — 85025 COMPLETE CBC W/AUTO DIFF WBC: CPT

## 2024-11-27 LAB
HBV DNA SERPL NAA+PROBE-ACNC: DETECTED [IU]/ML
HBV QUANTITATIVE RESULT (OHS): 197 IU/ML

## 2024-12-06 ENCOUNTER — PATIENT MESSAGE (OUTPATIENT)
Dept: FAMILY MEDICINE | Facility: CLINIC | Age: 39
End: 2024-12-06
Payer: MEDICAID

## 2024-12-06 DIAGNOSIS — R09.81 COUGH WITH CONGESTION OF PARANASAL SINUS: ICD-10-CM

## 2024-12-06 DIAGNOSIS — R05.8 COUGH WITH CONGESTION OF PARANASAL SINUS: ICD-10-CM

## 2024-12-06 RX ORDER — CETIRIZINE HYDROCHLORIDE 10 MG/1
10 TABLET ORAL NIGHTLY
Qty: 30 TABLET | Refills: 3 | Status: SHIPPED | OUTPATIENT
Start: 2024-12-06

## 2024-12-10 ENCOUNTER — PATIENT MESSAGE (OUTPATIENT)
Dept: INFECTIOUS DISEASES | Facility: CLINIC | Age: 39
End: 2024-12-10

## 2024-12-10 ENCOUNTER — OFFICE VISIT (OUTPATIENT)
Dept: INFECTIOUS DISEASES | Facility: CLINIC | Age: 39
End: 2024-12-10
Payer: MEDICAID

## 2024-12-10 DIAGNOSIS — B18.1 CHRONIC HEPATITIS B: Primary | ICD-10-CM

## 2024-12-10 PROCEDURE — 1160F RVW MEDS BY RX/DR IN RCRD: CPT | Mod: CPTII,95,, | Performed by: NURSE PRACTITIONER

## 2024-12-10 PROCEDURE — 99213 OFFICE O/P EST LOW 20 MIN: CPT | Mod: 95,,, | Performed by: NURSE PRACTITIONER

## 2024-12-10 PROCEDURE — 1159F MED LIST DOCD IN RCRD: CPT | Mod: CPTII,95,, | Performed by: NURSE PRACTITIONER

## 2024-12-10 NOTE — PROGRESS NOTES
Patient ID: Gretel Stein 39 y.o.     Chief Complaint:   Chief Complaint   Patient presents with    Followup Hep B     States did have Covid approx 1 month ago      Patient and provider are located in the state of [place].    Face to Face time with patient:  20 minutes of total time spent on the encounter, which includes face to face time and non-face to face time preparing to see the patient (eg, review of tests), Obtaining and/or reviewing separately obtained history, Documenting clinical information in the electronic or other health record, Independently interpreting results (not separately reported) and communicating results to the patient/family/caregiver, or Care coordination (not separately reported).     Each patient to whom he or she provides medical services by telemedicine is:  (1) informed of the relationship between the physician and patient and the respective role of any other health care provider with respect to management of the patient; and (2) notified that he or she may decline to receive medical services by telemedicine and may withdraw from such care at any time.  HPI:    12/10/24  Gretel is a 38 yo AF evaluated today via audio-video virtual visit for HBV f/u.  RUQ abd u/s repeated after increase in liver size and HBV VL noted. Labs 9/23/24 HBV DNA increased to 5625.  Repeat labs 11/26/24 HBV DNA decreased to 197 copies.  RUQ abd u/s 10/17/24 Liver 16.8 cm and mild fatty infiltration noted, improved over prior scan. She tells me that she did have COVID 1 month ago & is having some residual congestion & brain fog. Taking Dayquil on & off since with mild elevations in BP noted. Will change to Coricidin HBP as needed and try Airborne natural supplement as well. Will continue to monitor HBV closely at this juncture with repeat labs & U/S in 4 months. She denies jaundice, icterus, nausea, vomiting, dark urine, or omid colored stools.  All questions answered & concerns addressed.    9/23/24  Gretel is a  38 yo AF here today for HBV f/u visit. She is treatment naive and denies jaundice, icterus, nausea, vomiting, dark urine, or omid colored stools.  Labs 1/24 HBV <10, 8/24 AST decreased to 17, ALT decreased to 23. She is still trying to watch diet somewhat, encouraged healthy eating plan for liver & overall health. Last RUQ abd u/s 1/24 with hepatomegaly & steatosis. Will repeat scan. She tolerated 1st dose of Hep A vaccine well, amenable to 2nd dose today with flu vaccine. She has no concerns or questions today. Appreciates virtual visit, as she does have a 3rd son at home and would be more convenient. Will schedule as requested.     3/5/24  Gretel is a 37 yo  Female presenting today for HBV f/u.  Labs collected 1/22/24. Hep B VL <10, AST 35, ALT 59, Creatinine 0.68, AFP 5.50. She is not immune to Hep A, negative for Hep C, HIV & syphilis. RUQ abdominal u/s consistent with steatosis. She has already adjusted diet, cut back on alcohol, started exercise routine. Encouraged to continue same. She is amenable to Hep A vaccination, will give 1st dose today. All questions answered & concerns addressed.           Past Medical History:   Diagnosis Date    Chronic hepatitis B     Since birth according to patient.    Chronic hepatitis B affecting antepartum care of mother 08/02/2021    Mild intermittent asthma, uncomplicated     Nicotine dependence due to vaping tobacco product 08/16/2024        Past Surgical History:   Procedure Laterality Date    ADENOIDECTOMY      HERNIA REPAIR      TONSILLECTOMY          Social History     Socioeconomic History    Marital status: Single    Number of children: 2    Highest education level: Bachelor's degree (e.g., BA, AB, BS)   Occupational History    Occupation: resturant   Tobacco Use    Smoking status: Every Day     Types: Vaping with nicotine    Smokeless tobacco: Never   Substance and Sexual Activity    Alcohol use: Yes     Comment: Socially    Drug use: Never    Sexual activity:  Yes     Partners: Male     Birth control/protection: Condom, Pill     Social Drivers of Health     Financial Resource Strain: Low Risk  (5/18/2023)    Overall Financial Resource Strain (CARDIA)     Difficulty of Paying Living Expenses: Not hard at all   Food Insecurity: No Food Insecurity (5/18/2023)    Hunger Vital Sign     Worried About Running Out of Food in the Last Year: Never true     Ran Out of Food in the Last Year: Never true   Transportation Needs: No Transportation Needs (5/18/2023)    PRAPARE - Transportation     Lack of Transportation (Medical): No     Lack of Transportation (Non-Medical): No   Physical Activity: Sufficiently Active (5/18/2023)    Exercise Vital Sign     Days of Exercise per Week: 4 days     Minutes of Exercise per Session: 40 min   Stress: No Stress Concern Present (5/18/2023)    [place] [employer]ational [employer] - Occupational Stress Questionnaire     Feeling of Stress : Not at all   Housing Stability: Low Risk  (5/18/2023)    Housing Stability Vital Sign     Unable to Pay for Housing in the Last Year: No     Number of Places Lived in the Last Year: 1     Unstable Housing in the Last Year: No        Family History   Problem Relation Name Age of Onset    Hepatitis Mother          B    Hepatitis Father          B        Review of patient's allergies indicates:  No Known Allergies     Immunization History   Administered Date(s) Administered    COVID-19, MRNA, LN-S, PF (Pfizer) (Purple Cap) 08/15/2021, 09/05/2021    Hepatitis A, Adult 03/05/2024, 09/23/2024    Influenza - Quadrivalent - PF *Preferred* (6 months and older) 10/08/2019, 09/16/2021, 01/22/2024    Influenza - Trivalent - Fluarix, Flulaval, Fluzone, Afluria - PF 09/23/2024    Influenza A (H1N1) 2009 Monovalent - Intranasal 11/18/2009    Meningococcal Conjugate (MCV4P) 02/15/2006    Pneumococcal Conjugate - 20 Valent 05/18/2023    Tdap 10/08/2019        Review of Systems   Constitutional:  Positive for malaise/fatigue.    HENT:  Positive for congestion. Negative for sinus pain.    Eyes: Negative.    Respiratory: Negative.     Cardiovascular: Negative.    Gastrointestinal: Negative.    Genitourinary: Negative.    Musculoskeletal: Negative.    Skin: Negative.    Neurological: Negative.    Endo/Heme/Allergies: Negative.    Psychiatric/Behavioral: Negative.     All other systems reviewed and are negative.         Objective:      There were no vitals taken for this visit.     Physical Exam  Constitutional:       General: She is not in acute distress.     Appearance: Normal appearance.   HENT:      Head: Normocephalic.   Eyes:      Conjunctiva/sclera: Conjunctivae normal.   Pulmonary:      Effort: Pulmonary effort is normal.   Musculoskeletal:         General: Normal range of motion.      Cervical back: Normal range of motion.   Neurological:      General: No focal deficit present.      Mental Status: She is alert and oriented to person, place, and time. Mental status is at baseline.   Psychiatric:         Mood and Affect: Mood normal.         Behavior: Behavior normal.         Thought Content: Thought content normal.         Judgment: Judgment normal.          Labs:   Lab Results   Component Value Date    WBC 5.25 11/26/2024    HGB 14.0 11/26/2024    HCT 39.4 11/26/2024    MCV 90.0 11/26/2024     11/26/2024       CMP  Sodium   Date Value Ref Range Status   11/26/2024 140 136 - 145 mmol/L Final     Potassium   Date Value Ref Range Status   11/26/2024 3.7 3.5 - 5.1 mmol/L Final     Chloride   Date Value Ref Range Status   11/26/2024 106 98 - 107 mmol/L Final     CO2   Date Value Ref Range Status   11/26/2024 26 22 - 29 mmol/L Final     Blood Urea Nitrogen   Date Value Ref Range Status   11/26/2024 7.6 7.0 - 18.7 mg/dL Final     Creatinine   Date Value Ref Range Status   11/26/2024 0.72 0.55 - 1.02 mg/dL Final     Calcium   Date Value Ref Range Status   11/26/2024 9.3 8.4 - 10.2 mg/dL Final     Albumin   Date Value Ref Range Status    11/26/2024 4.2 3.5 - 5.0 g/dL Final     Bilirubin Total   Date Value Ref Range Status   11/26/2024 0.8 <=1.5 mg/dL Final     ALP   Date Value Ref Range Status   11/26/2024 38 (L) 40 - 150 unit/L Final     AST   Date Value Ref Range Status   11/26/2024 22 5 - 34 unit/L Final     ALT   Date Value Ref Range Status   11/26/2024 26 0 - 55 unit/L Final     eGFR   Date Value Ref Range Status   11/26/2024 >60 mL/min/1.73/m2 Final     Lab Results   Component Value Date    TSH 1.209 08/15/2024     HIV   Date Value Ref Range Status   08/15/2024 Nonreactive Nonreactive Final     INR   Date Value Ref Range Status   11/26/2024 1.0 <=1.3 Final     Syphilis Antibody   Date Value Ref Range Status   08/15/2024 Nonreactive Nonreactive, Equivocal Final     Hep C Ab Interp   Date Value Ref Range Status   08/15/2024 Nonreactive Nonreactive Final     Hep BsAg Conf   Date Value Ref Range Status   11/29/2023 Confirmed Positive  Final     Results for orders placed or performed in visit on 01/22/24   Hepatitis B e Antigen   Result Value Ref Range    Hepatitis Be Ag, S Negative Negative     Results for orders placed or performed in visit on 01/22/24   Hepatitis B e antibody   Result Value Ref Range    HBe Antibody Positive (A) Negative     Results for orders placed or performed in visit on 01/22/24   Hepatitis B DNA, Quant   Result Value Ref Range    HBV DNA Detect/Quant <10 (A) Undetected IU/mL       Imaging: Reviewed most recent relevant imaging studies available, notable results highlighted in this note      Medications:     Current Outpatient Medications   Medication Instructions    cetirizine (ZYRTEC) 10 mg, Oral, Nightly    diclofenac (VOLTAREN) 50 mg, Oral, 3 times daily PRN    fluticasone propionate (FLONASE) 100 mcg, Each Nostril, Daily    norethindrone (MICRONOR) 0.35 mg tablet 1 tablet, Daily       Assessment:       Problem List Items Addressed This Visit    None  Visit Diagnoses       Chronic hepatitis B    -  Primary    Relevant  "Orders    AFP Tumor Marker    CBC Auto Differential    Comprehensive Metabolic Panel    Protime-INR    HUGGINS Fibrosure    HEPATITIS B VIRAL DNA, QUANTITATIVE    US Abdomen Limited               Plan:      Chronic hepatitis B  -     AFP Tumor Marker; Future; Expected date: 04/01/2025  -     CBC Auto Differential; Future; Expected date: 04/01/2025  -     Comprehensive Metabolic Panel; Future; Expected date: 04/01/2025  -     Protime-INR; Future; Expected date: 04/01/2025  -     HUGGINS Fibrosure; Future; Expected date: 04/01/2025  -     HEPATITIS B VIRAL DNA, QUANTITATIVE; Future; Expected date: 04/01/2025  -     US Abdomen Limited; Future; Expected date: 04/01/2025  Diagnosed at birth, mother HBV + & on treatment.  Treatment naive, necroinflammation resolved.  Hep B e ag -, e ab +, Hep B c ab +, Hep B  (11/24)  1/22/24 AST 35, ALT 59, AFP 5.5  8/15/24 AST 17, ALT 23.   11/26/24 AST 22, ALT 26.   U/S 10/2024 consistent with mild steatosis.  Goal BMI <30.   Continue exercise activity to 30 minutes 3-5 times per week.  Avoid sugar sodas, simple sugars, sweets, large portions of rice, pasta, potatoes, or bread.   Choose whole grain/"brown" options when available, practice portion control.  Limit fast foods and fried foods.  Increase intake of fresh fruits and vegetables with lean meats (chicken, fish, etc.) daily.   Minimize alcohol intake, including beer and wine.  Consider permanent healthy lifestyle changes.  Minimize acetaminophen use.  Blood & sex precautions.   RTC 4 months with yobany Tariq.  Please cancel the scheduled visit with Carolina in April. Thank you.               "

## 2025-03-06 NOTE — ADDENDUM NOTE
Addended by: VIJAYA RENEE on: 11/30/2023 10:31 AM     Modules accepted: Orders     Copied from CRM #[id number]. Topic: MW Medication/Rx - MW Rx Refill  >> Mar 6, 2025  3:45 PM Pat CARVAJAL wrote:  Art Ibarra called to request a medication refill and is out of medications or critically low during working hours. Medication is:  Listed on Med Management list. Care Site to process refill: Selected 'Wrap Up CRM' and created new Refill Med Encounter after clicking 'Convert to Clinical Call'. Selected reason for call 'Refill Request'. Pended medication. Sent Med template and routed as high priority to appropriate clinician pool. Readback full message.    -- DO NOT REPLY / DO NOT REPLY ALL --  -- This inbox is not monitored. If this was sent to the wrong provider or department, reroute message to P ECO Reroute pool. --  -- Message is from Engagement Center Operations (ECO) --      Message Type:  Refill Medication   Refill request for Pended medication named: blood glucose  blood glucose (ONE TOUCH ULTRA TEST) test strip  Insulin Lispro (1 Unit Dial)  Insulin Lispro, 1 Unit Dial, (HumaLOG KwikPen) 100 UNIT/ML pen-injector    Preferred pharmacy verified, and selected.   [id number] [address] AT [facility]    Is the patient OUT of Medication?  Yes and During Work Hours Medication Refills handled by Care Site - route as high priority to care site pool on KB. Patient has been advised it will be addressed within 1 business day.     Message: Patient called stating that she's in need of a medication refill.

## 2025-04-01 ENCOUNTER — RESULTS FOLLOW-UP (OUTPATIENT)
Dept: INFECTIOUS DISEASES | Facility: CLINIC | Age: 40
End: 2025-04-01

## 2025-04-01 ENCOUNTER — HOSPITAL ENCOUNTER (OUTPATIENT)
Dept: RADIOLOGY | Facility: HOSPITAL | Age: 40
Discharge: HOME | End: 2025-04-01
Attending: NURSE PRACTITIONER
Payer: MEDICAID

## 2025-04-01 DIAGNOSIS — R09.81 COUGH WITH CONGESTION OF PARANASAL SINUS: ICD-10-CM

## 2025-04-01 DIAGNOSIS — B18.1 CHRONIC HEPATITIS B: ICD-10-CM

## 2025-04-01 DIAGNOSIS — R05.8 COUGH WITH CONGESTION OF PARANASAL SINUS: ICD-10-CM

## 2025-04-01 PROCEDURE — 76705 ECHO EXAM OF ABDOMEN: CPT | Mod: TC

## 2025-04-01 RX ORDER — CETIRIZINE HYDROCHLORIDE 10 MG/1
10 TABLET ORAL NIGHTLY
Qty: 30 TABLET | Refills: 3 | OUTPATIENT
Start: 2025-04-01

## 2025-04-01 NOTE — TELEPHONE ENCOUNTER
----- Message from Holley sent at 4/1/2025  8:34 AM CDT -----  Regarding: Refill  Provider: Rosie Cardenas Pharmacy: [facility] #[id number]  [place] - [address] AT [facility] [address] Visit:Next Visit:Patient's Contact Number:1. Name of Medication: cetirizine (ZYRTEC) 10 MG tabletDosage:Comments:2. Name of Medication:Dosage:Comments:3. Name of Medication:Dosage:Comments4. Name of Medication:Dosage:Comments:5. Name of Medication:Dosage:Comments:

## 2025-04-02 ENCOUNTER — TELEPHONE (OUTPATIENT)
Dept: INFECTIOUS DISEASES | Facility: CLINIC | Age: 40
End: 2025-04-02
Payer: MEDICAID

## 2025-04-02 RX ORDER — CETIRIZINE HYDROCHLORIDE 10 MG/1
10 TABLET ORAL NIGHTLY
Qty: 30 TABLET | Refills: 8 | Status: SHIPPED | OUTPATIENT
Start: 2025-04-02 | End: 2026-04-02

## 2025-04-02 NOTE — TELEPHONE ENCOUNTER
"----- Message from ANANT Harris sent at 4/1/2025  1:30 PM CDT -----  RUQ abd u/s results reviewed.  Liver is again enlarged, similar findings to 1 year ago and consistent with steatosis.  Please phone patient with results and recommendations:  Goal BMI <30.   Increase exercise activity to 30 minutes 3-5 times per week.  Avoid sugar sodas, simple sugars, sweets, large portions of rice, pasta, potatoes, or bread.   Choose whole grain/"brown" options when available, practice portion control.  Limit fast foods and fried foods.  Increase intake of fresh fruits and vegetables with lean meats (chicken, fish, etc.) daily.   Minimize alcohol intake, including beer and wine.  Consider permanent healthy lifestyle changes.    We can consider referral to GI for additional recommendations as well if she would like. Thank you.     ----- Message -----  From: Interface, Rad Results In  Sent: 4/1/2025   8:27 AM CDT  To: ANANT Lucia    "

## 2025-04-02 NOTE — TELEPHONE ENCOUNTER
"Phoned patient. Discussed RUQ abd u/s results reviewed.  Liver is again enlarged, similar findings to 1 year ago and consistent with steatosis.  The following recommendations reviewed with patient :  Goal BMI <30. Increase exercise activity to 30 minutes 3-5 times per week. Avoid sugar sodas, simple sugars, sweets, large portions of rice, pasta, potatoes, or bread. Choose whole grain/"brown" options when available, practice portion control. Limit fast foods and fried foods. Increase intake of fresh fruits and vegetables with lean meats (chicken, fish, etc.) daily. Minimize alcohol intake, including beer and wine. Consider permanent healthy lifestyle changes. Declines GI referral. Voiced understanding and appreciates call.  "

## 2025-05-21 ENCOUNTER — LAB VISIT (OUTPATIENT)
Dept: LAB | Facility: HOSPITAL | Age: 40
End: 2025-05-21
Attending: NURSE PRACTITIONER
Payer: MEDICAID

## 2025-05-21 DIAGNOSIS — B18.1 CHRONIC HEPATITIS B: ICD-10-CM

## 2025-05-21 LAB
AFP-TM SERPL-MCNC: 2.6 NG/ML
ALBUMIN SERPL-MCNC: 4 G/DL (ref 3.5–5)
ALBUMIN/GLOB SERPL: 1.1 RATIO (ref 1.1–2)
ALP SERPL-CCNC: 36 UNIT/L (ref 40–150)
ALT SERPL-CCNC: 42 UNIT/L (ref 0–55)
ANION GAP SERPL CALC-SCNC: 10 MEQ/L
AST SERPL-CCNC: 29 UNIT/L (ref 11–45)
BASOPHILS # BLD AUTO: 0.03 X10(3)/MCL
BASOPHILS NFR BLD AUTO: 0.5 %
BILIRUB SERPL-MCNC: 0.6 MG/DL
BUN SERPL-MCNC: 9.5 MG/DL (ref 7–18.7)
CALCIUM SERPL-MCNC: 9.2 MG/DL (ref 8.4–10.2)
CHLORIDE SERPL-SCNC: 106 MMOL/L (ref 98–107)
CO2 SERPL-SCNC: 23 MMOL/L (ref 22–29)
CREAT SERPL-MCNC: 0.71 MG/DL (ref 0.55–1.02)
CREAT/UREA NIT SERPL: 13
EOSINOPHIL # BLD AUTO: 0.22 X10(3)/MCL (ref 0–0.9)
EOSINOPHIL NFR BLD AUTO: 3.9 %
ERYTHROCYTE [DISTWIDTH] IN BLOOD BY AUTOMATED COUNT: 11.6 % (ref 11.5–17)
GFR SERPLBLD CREATININE-BSD FMLA CKD-EPI: >60 ML/MIN/1.73/M2
GLOBULIN SER-MCNC: 3.7 GM/DL (ref 2.4–3.5)
GLUCOSE SERPL-MCNC: 105 MG/DL (ref 74–100)
HCT VFR BLD AUTO: 39.5 % (ref 37–47)
HGB BLD-MCNC: 13.5 G/DL (ref 12–16)
IMM GRANULOCYTES # BLD AUTO: 0.03 X10(3)/MCL (ref 0–0.04)
IMM GRANULOCYTES NFR BLD AUTO: 0.5 %
INR PPP: 1
LYMPHOCYTES # BLD AUTO: 1.65 X10(3)/MCL (ref 0.6–4.6)
LYMPHOCYTES NFR BLD AUTO: 29.2 %
MCH RBC QN AUTO: 31.2 PG (ref 27–31)
MCHC RBC AUTO-ENTMCNC: 34.2 G/DL (ref 33–36)
MCV RBC AUTO: 91.2 FL (ref 80–94)
MONOCYTES # BLD AUTO: 0.37 X10(3)/MCL (ref 0.1–1.3)
MONOCYTES NFR BLD AUTO: 6.5 %
NEUTROPHILS # BLD AUTO: 3.36 X10(3)/MCL (ref 2.1–9.2)
NEUTROPHILS NFR BLD AUTO: 59.4 %
NRBC BLD AUTO-RTO: 0 %
PLATELET # BLD AUTO: 263 X10(3)/MCL (ref 130–400)
PMV BLD AUTO: 9.1 FL (ref 7.4–10.4)
POTASSIUM SERPL-SCNC: 3.7 MMOL/L (ref 3.5–5.1)
PROT SERPL-MCNC: 7.7 GM/DL (ref 6.4–8.3)
PROTHROMBIN TIME: 13.3 SECONDS (ref 11.4–14)
RBC # BLD AUTO: 4.33 X10(6)/MCL (ref 4.2–5.4)
SODIUM SERPL-SCNC: 139 MMOL/L (ref 136–145)
WBC # BLD AUTO: 5.66 X10(3)/MCL (ref 4.5–11.5)

## 2025-05-21 PROCEDURE — 36415 COLL VENOUS BLD VENIPUNCTURE: CPT

## 2025-05-21 PROCEDURE — 0003M LIVER DIS 10 ASSAYS W/NASH: CPT

## 2025-05-21 PROCEDURE — 85025 COMPLETE CBC W/AUTO DIFF WBC: CPT

## 2025-05-21 PROCEDURE — 87517 HEPATITIS B DNA QUANT: CPT

## 2025-05-21 PROCEDURE — 80053 COMPREHEN METABOLIC PANEL: CPT

## 2025-05-21 PROCEDURE — 85610 PROTHROMBIN TIME: CPT

## 2025-05-21 PROCEDURE — 82105 ALPHA-FETOPROTEIN SERUM: CPT

## 2025-05-22 LAB
HBV DNA SERPL NAA+PROBE-ACNC: 185 IU/ML
HBV DNA SERPL NAA+PROBE-ACNC: DETECTED [IU]/ML

## 2025-05-27 ENCOUNTER — OFFICE VISIT (OUTPATIENT)
Dept: INFECTIOUS DISEASES | Facility: CLINIC | Age: 40
End: 2025-05-27
Payer: MEDICAID

## 2025-05-27 DIAGNOSIS — B18.1 CHRONIC HEPATITIS B: Primary | ICD-10-CM

## 2025-05-27 DIAGNOSIS — K76.0 STEATOSIS OF LIVER: ICD-10-CM

## 2025-05-27 DIAGNOSIS — L30.9 ECZEMA, UNSPECIFIED TYPE: ICD-10-CM

## 2025-05-27 LAB
A2 MACROGLOB SERPL-MCNC: 277 MG/DL (ref 100–280)
ALT SERPL W P-5'-P-CCNC: 48 U/L (ref 7–45)
ANNOTATION COMMENT IMP: ABNORMAL
APO A-I SERPL-MCNC: 129 MG/DL
AST SERPL W P-5'-P-CCNC: 33 U/L (ref 8–43)
BILIRUB SERPL-MCNC: 0.5 MG/DL (ref 0–1.2)
CHOLEST SERPL-MCNC: 157 MG/DL
FIBROSIS STAGE SERPL QL: ABNORMAL
GGT SERPL-CCNC: 34 U/L (ref 5–36)
GLUCOSE P FAST SERPL-MCNC: 106 MG/DL (ref 70–100)
HAPTOGLOB SERPL NEPH-MCNC: 140 MG/DL (ref 30–200)
LIVER FIBR SCORE SERPL CALC.FIBROSURE: 0.26
LIVER FIBROSIS INTERPRETATION SER-IMP: ABNORMAL
LIVER STEATOSIS GRADE SERPL QL: ABNORMAL
LIVER STEATOSIS INTERP SERPL-IMP: ABNORMAL
LIVER STEATOSIS SCORE SERPL: 0.44
NASH GRADE SERPL QL: ABNORMAL
NASH INTERPRETATION SERPL-IMP: ABNORMAL
NASH SCORE SERPL: 0.48
SERIAL #: ABNORMAL
TRIGL SERPL-MCNC: 98 MG/DL

## 2025-05-27 PROCEDURE — 98006 SYNCH AUDIO-VIDEO EST MOD 30: CPT | Mod: 95,,, | Performed by: NURSE PRACTITIONER

## 2025-05-27 PROCEDURE — 1160F RVW MEDS BY RX/DR IN RCRD: CPT | Mod: CPTII,95,, | Performed by: NURSE PRACTITIONER

## 2025-05-27 PROCEDURE — 1159F MED LIST DOCD IN RCRD: CPT | Mod: CPTII,95,, | Performed by: NURSE PRACTITIONER

## 2025-05-27 RX ORDER — TRIAMCINOLONE ACETONIDE 1 MG/G
OINTMENT TOPICAL 2 TIMES DAILY
Qty: 30 G | Refills: 2 | Status: SHIPPED | OUTPATIENT
Start: 2025-05-27

## 2025-05-27 NOTE — PROGRESS NOTES
Patient ID: Gretel Stein 40 y.o.   Patient and provider are located in the state [facility].    Face to Face time with patient:  30 minutes of total time spent on the encounter, which includes face to face time and non-face to face time preparing to see the patient (eg, review of tests), Obtaining and/or reviewing separately obtained history, Documenting clinical information in the electronic or other health record, Independently interpreting results (not separately reported) and communicating results to the patient/family/caregiver, or Care coordination (not separately reported).     Each patient to whom he or she provides medical services by telemedicine is:  (1) informed of the relationship between the physician and patient and the respective role of any other health care provider with respect to management of the patient; and (2) notified that he or she may decline to receive medical services by telemedicine and may withdraw from such care at any time.  Chief Complaint:   Chief Complaint   Patient presents with    Followup Hep B     Wanted to ask about PCP, provider being reassigned and having eczema flair ups and questions med in the mean time        HPI:    5/27/25  Gretel is a 41 yo AF evaluated today via audio-video virtual visit for f/u. She remains HBV treatment naive and HBV VL remains low, 185 copies 5/21/25.  She rarely drinks alcohol, but does struggle with maintaining a healthy diet. RUQ abd u/s 4/1/25 with hepatomegaly & steatosis noted. Given her age and consistent steatotic liver disease, will refer to GI for evaluation for additional treatment options.  FibroScan and HUGGINS Fibrosure ordered as well. She also tells me that her PCP is no longer in that department & has been waiting for some time to be reassigned.  Appreciates referral to [facility] to be re-established with a new PCP. She is currently experiencing an eczema flare that is worse than usual. Has used triamcinolone ointment in the past when  flares are worsened as they are now but does not have any ointment or PCP to prescribe same. Will send in prescription to address pt's needs. Voiced appreciation. All questions answered & concerns addressed.    12/10/24  Gretel is a 38 yo AF evaluated today via audio-video virtual visit for HBV f/u.  RUQ abd u/s repeated after increase in liver size and HBV VL noted. Labs 9/23/24 HBV DNA increased to 5625.  Repeat labs 11/26/24 HBV DNA decreased to 197 copies.  RUQ abd u/s 10/17/24 Liver 16.8 cm and mild fatty infiltration noted, improved over prior scan. She tells me that she did have COVID 1 month ago & is having some residual congestion & brain fog. Taking Dayquil on & off since with mild elevations in BP noted. Will change to Coricidin HBP as needed and try Airborne natural supplement as well. Will continue to monitor HBV closely at this juncture with repeat labs & U/S in 4 months. She denies jaundice, icterus, nausea, vomiting, dark urine, or omid colored stools.  All questions answered & concerns addressed.     9/23/24  Gretel is a 38 yo AF here today for HBV f/u visit. She is treatment naive and denies jaundice, icterus, nausea, vomiting, dark urine, or omid colored stools.  Labs 1/24 HBV <10, 8/24 AST decreased to 17, ALT decreased to 23. She is still trying to watch diet somewhat, encouraged healthy eating plan for liver & overall health. Last RUQ abd u/s 1/24 with hepatomegaly & steatosis. Will repeat scan. She tolerated 1st dose of Hep A vaccine well, amenable to 2nd dose today with flu vaccine. She has no concerns or questions today. Appreciates virtual visit, as she does have a 3rd son at home and would be more convenient. Will schedule as requested.              Past Medical History:   Diagnosis Date    Chronic hepatitis B     Since birth according to patient.    Chronic hepatitis B affecting antepartum care of mother 08/02/2021    Mild intermittent asthma, uncomplicated     Nicotine dependence due to  vaping tobacco product 08/16/2024        Past Surgical History:   Procedure Laterality Date    ADENOIDECTOMY      HERNIA REPAIR      TONSILLECTOMY          Social History     Socioeconomic History    Marital status: Single    Number of children: 2    Highest education level: Bachelor's degree (e.g., BA, AB, BS)   Occupational History    Occupation: resturant   Tobacco Use    Smoking status: Every Day     Types: Vaping with nicotine    Smokeless tobacco: Never   Substance and Sexual Activity    Alcohol use: Yes     Comment: Socially    Drug use: Never    Sexual activity: Yes     Partners: Male     Birth control/protection: Condom, Pill     Social Drivers of Health     Financial Resource Strain: Low Risk  (5/18/2023)    Overall Financial Resource Strain (CARDIA)     Difficulty of Paying Living Expenses: Not hard at all   Food Insecurity: No Food Insecurity (5/18/2023)    Hunger Vital Sign     Worried About Running Out of Food in the Last Year: Never true     Ran Out of Food in the Last Year: Never true   Transportation Needs: No Transportation Needs (5/18/2023)    PRAPARE - Transportation     Lack of Transportation (Medical): No     Lack of Transportation (Non-Medical): No   Physical Activity: Sufficiently Active (5/18/2023)    Exercise Vital Sign     Days of Exercise per Week: 4 days     Minutes of Exercise per Session: 40 min   Stress: No Stress Concern Present (5/18/2023)    [employer] [employer] - Occupational Stress Questionnaire     Feeling of Stress : Not at all   Housing Stability: Low Risk  (5/18/2023)    Housing Stability Vital Sign     Unable to Pay for Housing in the Last Year: No     Number of Places Lived in the Last Year: 1     Unstable Housing in the Last Year: No        Family History   Problem Relation Name Age of Onset    Hepatitis Mother          B    Hepatitis Father          B        Review of patient's allergies indicates:  No Known Allergies     Immunization History    Administered Date(s) Administered    COVID-19, MRNA, LN-S, PF (Pfizer) (Purple Cap) 08/15/2021, 09/05/2021    Hepatitis A, Adult 03/05/2024, 09/23/2024    Influenza - Quadrivalent - PF *Preferred* (6 months and older) 10/08/2019, 09/16/2021, 01/22/2024    Influenza - Trivalent - Fluarix, Flulaval, Fluzone, Afluria - PF 09/23/2024    Influenza A (H1N1) 2009 Monovalent - Intranasal 11/18/2009    Meningococcal Conjugate (MCV4P) 02/15/2006    Pneumococcal Conjugate - 20 Valent 05/18/2023    Tdap 10/08/2019        Review of Systems   Constitutional: Negative.    HENT: Negative.     Eyes: Negative.    Respiratory: Negative.     Cardiovascular: Negative.    Gastrointestinal: Negative.    Genitourinary: Negative.    Musculoskeletal: Negative.    Skin:  Positive for itching and rash.   Neurological: Negative.    Endo/Heme/Allergies: Negative.    Psychiatric/Behavioral: Negative.     All other systems reviewed and are negative.         Objective:      There were no vitals taken for this visit.     Physical Exam  Constitutional:       General: She is not in acute distress.     Appearance: Normal appearance.   HENT:      Head: Normocephalic.   Eyes:      Conjunctiva/sclera: Conjunctivae normal.   Pulmonary:      Effort: Pulmonary effort is normal.   Musculoskeletal:         General: Normal range of motion.      Cervical back: Normal range of motion.   Neurological:      General: No focal deficit present.      Mental Status: She is alert and oriented to person, place, and time. Mental status is at baseline.   Psychiatric:         Mood and Affect: Mood normal.         Behavior: Behavior normal.         Thought Content: Thought content normal.         Judgment: Judgment normal.          Labs:   Lab Results   Component Value Date    WBC 5.66 05/21/2025    HGB 13.5 05/21/2025    HCT 39.5 05/21/2025    MCV 91.2 05/21/2025     05/21/2025       CMP  Sodium   Date Value Ref Range Status   05/21/2025 139 136 - 145 mmol/L Final      Potassium   Date Value Ref Range Status   05/21/2025 3.7 3.5 - 5.1 mmol/L Final     Chloride   Date Value Ref Range Status   05/21/2025 106 98 - 107 mmol/L Final     CO2   Date Value Ref Range Status   05/21/2025 23 22 - 29 mmol/L Final     Glucose   Date Value Ref Range Status   05/21/2025 105 (H) 74 - 100 mg/dL Final     Blood Urea Nitrogen   Date Value Ref Range Status   05/21/2025 9.5 7.0 - 18.7 mg/dL Final     Creatinine   Date Value Ref Range Status   05/21/2025 0.71 0.55 - 1.02 mg/dL Final     Calcium   Date Value Ref Range Status   05/21/2025 9.2 8.4 - 10.2 mg/dL Final     Protein Total   Date Value Ref Range Status   05/21/2025 7.7 6.4 - 8.3 gm/dL Final     Albumin   Date Value Ref Range Status   05/21/2025 4.0 3.5 - 5.0 g/dL Final     Bilirubin Total   Date Value Ref Range Status   05/21/2025 0.6 <=1.5 mg/dL Final     ALP   Date Value Ref Range Status   05/21/2025 36 (L) 40 - 150 unit/L Final     AST   Date Value Ref Range Status   05/21/2025 29 11 - 45 unit/L Final     ALT   Date Value Ref Range Status   05/21/2025 42 0 - 55 unit/L Final     eGFR   Date Value Ref Range Status   05/21/2025 >60 mL/min/1.73/m2 Final     Comment:     Estimated GFR calculated using the CKD-EPI creatinine (2021) equation.     Lab Results   Component Value Date    TSH 1.209 08/15/2024     HIV   Date Value Ref Range Status   08/15/2024 Nonreactive Nonreactive Final     PT   Date Value Ref Range Status   05/21/2025 13.3 11.4 - 14.0 seconds Final     INR   Date Value Ref Range Status   05/21/2025 1.0 <=1.3 Final     Syphilis Antibody   Date Value Ref Range Status   08/15/2024 Nonreactive Nonreactive, Equivocal Final     Hep C Ab Interp   Date Value Ref Range Status   08/15/2024 Nonreactive Nonreactive Final     Hep BsAg Conf   Date Value Ref Range Status   11/29/2023 Confirmed Positive  Final     Results for orders placed or performed in visit on 01/22/24   Hepatitis B e Antigen   Result Value Ref Range    Hepatitis Be Ag, S  Negative Negative     Results for orders placed or performed in visit on 01/22/24   Hepatitis B e antibody   Result Value Ref Range    HBe Antibody Positive (A) Negative     Results for orders placed or performed in visit on 01/22/24   Hepatitis B DNA, Quant   Result Value Ref Range    HBV DNA Detect/Quant <10 (A) Undetected IU/mL       Imaging: Reviewed most recent relevant imaging studies available, notable results highlighted in this note      Medications:     Current Outpatient Medications   Medication Instructions    cetirizine (ZYRTEC) 10 mg, Oral, Nightly    diclofenac (VOLTAREN) 50 mg, Oral, 3 times daily PRN    drospirenone (contraceptive) 4 mg    fluticasone propionate (FLONASE) 100 mcg, Each Nostril, Daily    triamcinolone acetonide 0.1% (KENALOG) 0.1 % ointment Topical (Top), 2 times daily       Assessment:       Problem List Items Addressed This Visit    None  Visit Diagnoses         Chronic hepatitis B    -  Primary    Relevant Orders    HIV 1/2 Ag/Ab (4th Gen)    CBC Auto Differential    Comprehensive Metabolic Panel    Protime-INR    HUGGINS Fibrosure    HEPATITIS B VIRAL DNA, QUANTITATIVE    Hepatitis B Surface Antigen    US Elastography Liver w/imaging    US Abdomen Limited      Eczema, unspecified type        Relevant Medications    triamcinolone acetonide 0.1% (KENALOG) 0.1 % ointment    Other Relevant Orders    Ambulatory referral/consult to Internal Medicine      Steatosis of liver        Relevant Orders    Comprehensive Metabolic Panel    Protime-INR    HUGGINS Fibrosure    US Elastography Liver w/imaging    US Abdomen Limited    Ambulatory referral/consult to Gastroenterology               Plan:      Chronic hepatitis B  -     HIV 1/2 Ag/Ab (4th Gen); Future; Expected date: 09/01/2025  -     CBC Auto Differential; Future; Expected date: 09/01/2025  -     Comprehensive Metabolic Panel; Future; Expected date: 09/01/2025  -     Protime-INR; Future; Expected date: 09/01/2025  -     HUGGINS Fibrosure;  "Future; Expected date: 09/01/2025  -     HEPATITIS B VIRAL DNA, QUANTITATIVE; Future; Expected date: 09/01/2025  -     Hepatitis B Surface Antigen; Future; Expected date: 09/01/2025  -     US Elastography Liver w/imaging; Future; Expected date: 06/10/2025  -     US Abdomen Limited; Future; Expected date: 09/01/2025  Diagnosed at birth, mother HBV + & on treatment.  Treatment naive, necroinflammation resolved.  Hep B e ag -, e ab +, Hep B c ab +, Hep B  (11/24)  1/22/24 AST 35, ALT 59, AFP 5.5  8/15/24 AST 17, ALT 23.   11/26/24 AST 22, ALT 26.   5/21/25 HBV , AST 29, ALT 42, AFP 2.60.  U/S 4/1/25 consistent with hepatomegaly and steatosis.  Minimize acetaminophen use.  Blood & sex precautions.   Labs, FibroScan, and U/S prior to next visit.   RTC 4 months with yobany Tariq.    Eczema, unspecified type  -     Ambulatory referral/consult to Internal Medicine; Future; Expected date: 06/10/2025  -     triamcinolone acetonide 0.1% (KENALOG) 0.1 % ointment; Apply topically 2 (two) times daily.  Dispense: 30 g; Refill: 2  Triamcinolone 0.1% BID to affected areas.     Steatosis of liver  -     Comprehensive Metabolic Panel; Future; Expected date: 09/01/2025  -     Protime-INR; Future; Expected date: 09/01/2025  -     HUGGINS Fibrosure; Future; Expected date: 09/01/2025  -     US Elastography Liver w/imaging; Future; Expected date: 06/10/2025  -     US Abdomen Limited; Future; Expected date: 09/01/2025  -     Ambulatory referral/consult to Gastroenterology; Future; Expected date: 06/10/2025  Goal BMI <30.   Continue exercise activity to 30 minutes 3-5 times per week.  Avoid sugar sodas, simple sugars, sweets, large portions of rice, pasta, potatoes, or bread.   Choose whole grain/"brown" options when available, practice portion control.  Limit fast foods and fried foods.  Increase intake of fresh fruits and vegetables with lean meats (chicken, fish, etc.) daily.   Minimize alcohol intake, including beer and " wine.  Consider permanent healthy lifestyle changes.  FibroScan, HUGGINS Fibrosure, and U/S prior to next visit.   Refer to GI for additional evaluation & treatment options.

## 2025-09-02 ENCOUNTER — HOSPITAL ENCOUNTER (OUTPATIENT)
Dept: RADIOLOGY | Facility: HOSPITAL | Age: 40
Discharge: HOME | End: 2025-09-02
Attending: NURSE PRACTITIONER
Payer: MEDICAID

## 2025-09-02 DIAGNOSIS — B18.1 CHRONIC HEPATITIS B: ICD-10-CM

## 2025-09-02 DIAGNOSIS — K76.0 STEATOSIS OF LIVER: ICD-10-CM

## 2025-09-02 PROCEDURE — 76705 ECHO EXAM OF ABDOMEN: CPT | Mod: TC
